# Patient Record
Sex: FEMALE | Race: WHITE | HISPANIC OR LATINO | Employment: STUDENT | URBAN - METROPOLITAN AREA
[De-identification: names, ages, dates, MRNs, and addresses within clinical notes are randomized per-mention and may not be internally consistent; named-entity substitution may affect disease eponyms.]

---
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
## 2022-04-20 ENCOUNTER — TELEPHONE (OUTPATIENT)
Dept: OBSTETRICS AND GYNECOLOGY | Facility: CLINIC | Age: 22
End: 2022-04-20
Payer: MEDICAID

## 2022-04-20 NOTE — TELEPHONE ENCOUNTER
----- Message from Colleen Barron sent at 4/20/2022 11:52 AM CDT -----  Contact: [phone]  Type:  Needs Medical Advice    Who Called: Pt  Reason for call: regarding scheduling for first Ob appt, pt would like a Female Provider  Would the patient rather a call back or a response via [facility]?  Call back  Best Call Back Number: [phone]

## 2022-05-02 ENCOUNTER — LAB VISIT (OUTPATIENT)
Dept: LAB | Facility: HOSPITAL | Age: 22
End: 2022-05-02
Attending: OBSTETRICS & GYNECOLOGY
Payer: MEDICAID

## 2022-05-02 ENCOUNTER — INITIAL PRENATAL (OUTPATIENT)
Dept: OBSTETRICS AND GYNECOLOGY | Facility: CLINIC | Age: 22
End: 2022-05-02
Payer: MEDICAID

## 2022-05-02 VITALS — DIASTOLIC BLOOD PRESSURE: 70 MMHG | BODY MASS INDEX: 34.37 KG/M2 | SYSTOLIC BLOOD PRESSURE: 110 MMHG | WEIGHT: 194 LBS

## 2022-05-02 DIAGNOSIS — O36.80X0 PREGNANCY OF UNKNOWN ANATOMIC LOCATION: ICD-10-CM

## 2022-05-02 DIAGNOSIS — Z34.01 FIRST PREGNANCY, FIRST TRIMESTER: Primary | ICD-10-CM

## 2022-05-02 DIAGNOSIS — Z34.01 FIRST PREGNANCY, FIRST TRIMESTER: ICD-10-CM

## 2022-05-02 LAB
ABO + RH BLD: NORMAL
BLD GP AB SCN CELLS X3 SERPL QL: NORMAL
ERYTHROCYTE [DISTWIDTH] IN BLOOD BY AUTOMATED COUNT: 12.8 % (ref 11.5–14.5)
HCT VFR BLD AUTO: 38.8 % (ref 37–48.5)
HGB BLD-MCNC: 13.1 G/DL (ref 12–16)
MCH RBC QN AUTO: 30.9 PG (ref 27–31)
MCHC RBC AUTO-ENTMCNC: 33.8 G/DL (ref 32–36)
MCV RBC AUTO: 92 FL (ref 82–98)
PLATELET # BLD AUTO: 330 K/UL (ref 150–450)
PMV BLD AUTO: 11.3 FL (ref 9.2–12.9)
RBC # BLD AUTO: 4.24 M/UL (ref 4–5.4)
TSH SERPL DL<=0.005 MIU/L-ACNC: 0.82 UIU/ML (ref 0.4–4)
WBC # BLD AUTO: 10.77 K/UL (ref 3.9–12.7)

## 2022-05-02 PROCEDURE — 99212 OFFICE O/P EST SF 10 MIN: CPT | Mod: PBBFAC,TH,PO | Performed by: OBSTETRICS & GYNECOLOGY

## 2022-05-02 PROCEDURE — 87088 URINE BACTERIA CULTURE: CPT | Performed by: OBSTETRICS & GYNECOLOGY

## 2022-05-02 PROCEDURE — 86762 RUBELLA ANTIBODY: CPT | Performed by: OBSTETRICS & GYNECOLOGY

## 2022-05-02 PROCEDURE — 86592 SYPHILIS TEST NON-TREP QUAL: CPT | Performed by: OBSTETRICS & GYNECOLOGY

## 2022-05-02 PROCEDURE — 87077 CULTURE AEROBIC IDENTIFY: CPT | Performed by: OBSTETRICS & GYNECOLOGY

## 2022-05-02 PROCEDURE — 86850 RBC ANTIBODY SCREEN: CPT | Performed by: OBSTETRICS & GYNECOLOGY

## 2022-05-02 PROCEDURE — 87389 HIV-1 AG W/HIV-1&-2 AB AG IA: CPT | Performed by: OBSTETRICS & GYNECOLOGY

## 2022-05-02 PROCEDURE — 87591 N.GONORRHOEAE DNA AMP PROB: CPT | Performed by: OBSTETRICS & GYNECOLOGY

## 2022-05-02 PROCEDURE — 81220 CFTR GENE COM VARIANTS: CPT | Performed by: OBSTETRICS & GYNECOLOGY

## 2022-05-02 PROCEDURE — 87340 HEPATITIS B SURFACE AG IA: CPT | Performed by: OBSTETRICS & GYNECOLOGY

## 2022-05-02 PROCEDURE — 86803 HEPATITIS C AB TEST: CPT | Performed by: OBSTETRICS & GYNECOLOGY

## 2022-05-02 PROCEDURE — 87086 URINE CULTURE/COLONY COUNT: CPT | Performed by: OBSTETRICS & GYNECOLOGY

## 2022-05-02 PROCEDURE — 99999 PR PBB SHADOW E&M-EST. PATIENT-LVL II: CPT | Mod: PBBFAC,,, | Performed by: OBSTETRICS & GYNECOLOGY

## 2022-05-02 PROCEDURE — 85027 COMPLETE CBC AUTOMATED: CPT | Performed by: OBSTETRICS & GYNECOLOGY

## 2022-05-02 PROCEDURE — 87491 CHLMYD TRACH DNA AMP PROBE: CPT | Performed by: OBSTETRICS & GYNECOLOGY

## 2022-05-02 PROCEDURE — 99204 PR OFFICE/OUTPT VISIT, NEW, LEVL IV, 45-59 MIN: ICD-10-PCS | Mod: TH,S$PBB,, | Performed by: OBSTETRICS & GYNECOLOGY

## 2022-05-02 PROCEDURE — 84443 ASSAY THYROID STIM HORMONE: CPT | Performed by: OBSTETRICS & GYNECOLOGY

## 2022-05-02 PROCEDURE — 99999 PR PBB SHADOW E&M-EST. PATIENT-LVL II: ICD-10-PCS | Mod: PBBFAC,,, | Performed by: OBSTETRICS & GYNECOLOGY

## 2022-05-02 PROCEDURE — 87186 SC STD MICRODIL/AGAR DIL: CPT | Performed by: OBSTETRICS & GYNECOLOGY

## 2022-05-02 PROCEDURE — 99204 OFFICE O/P NEW MOD 45 MIN: CPT | Mod: TH,S$PBB,, | Performed by: OBSTETRICS & GYNECOLOGY

## 2022-05-02 RX ORDER — B-COMPLEX WITH VITAMIN C
1 TABLET ORAL DAILY
Qty: 30 TABLET | Refills: 5 | Status: SHIPPED | OUTPATIENT
Start: 2022-05-02 | End: 2023-05-02

## 2022-05-02 RX ORDER — ONDANSETRON 8 MG/1
8 TABLET, ORALLY DISINTEGRATING ORAL EVERY 8 HOURS PRN
Qty: 40 TABLET | Refills: 3 | Status: SHIPPED | OUTPATIENT
Start: 2022-05-02 | End: 2022-05-12

## 2022-05-02 NOTE — PROGRESS NOTES
OBSTETRIC HISTORY:   OB History        1    Para   0    Term   0       0    AB   0    Living   0       SAB   0    IAB   0    Ectopic   0    Multiple   0    Live Births                      COMPREHENSIVE GYN HISTORY:  PAP History: Denies abnormal Paps.  Infection History: Chlamydia. Denies PID.  Benign History: Denies uterine fibroids. Denies ovarian cysts. Denies endometriosis.   Cancer History: Denies cervical cancer. Denies uterine cancer or hyperplasia. Denies ovarian cancer. Denies vulvar cancer or pre-cancer. Denies vaginal cancer or pre-cancer. Denies breast cancer. Denies colon cancer.  Sexual Activity History:   reports previously being sexually active.   Menstrual History: Regular menses.  No history of birth defects     HPI:   22 y.o.  Patient's last menstrual period was 2022.   Patient is  here for pregnancy confirmation. Some nausea..    ROS:  GENERAL: Denies weight gain or weight loss. Feeling well overall.   SKIN: Denies rash or lesions.   HEAD: Denies headache.   NODES: Denies enlarged lymph nodes.   CHEST: Denies shortness of breath.   ABDOMEN: No abdominal pain, constipation, diarrhea, vomiting or rectal bleeding.   URINARY: No frequency, dysuria, hematuria, or burning on urination.  REPRODUCTIVE: See HPI.   BREASTS: The patient denies pain, lumps, or nipple discharge.   HEMATOLOGIC: No easy bruisability.   MUSCULOSKELETAL: Denies joint pain or back pain.   NEUROLOGIC: Denies weakness.   PSYCHIATRIC: Denies depression, anxiety or mood swings.    PE:   /70   Wt 88 kg (194 lb 0.1 oz)   LMP 2022   BMI 34.37 kg/m²   APPEARANCE: Well nourished, well developed, in no acute distress.  NECK: Neck symmetric without  thyromegaly.  NODES: No inguinal, cervical lymph node enlargement.  CHEST: Lungs clear to auscultation.  HEART: Regular rate and rhythm, no murmurs, rubs or gallops.  ABDOMEN: Soft. No tenderness or masses. No hernias.  BREASTS: Symmetrical, no skin changes or  visible lesions. No palpable masses, nipple discharge or adenopathy bilaterally.  PELVIC:   VULVA: No lesions. Normal female genitalia.  URETHRAL MEATUS: Normal size and location, no lesions, no prolapse.  URETHRA: No masses, tenderness, prolapse or scarring.  VAGINA: Moist and well rugated, no discharge, no significant cystocele or rectocele.  CERVIX: No lesions and discharge.  UTERUS: Normal size, regular shape, mobile, non-tender, bladder base nontender.  ADNEXA: No masses or tenderness.    PROCEDURES:  Pap smear--NOT INDICATED  GC/CT  Urine cx  OB labs  US    Assessment/Plan:  Pregnancy  RTO 5 weeks      As of April 1, 2021, the Cures Act has been passed nationally. This new law requires that all doctors progress notes, lab results, pathology reports and radiology reports be released IMMEDIATELY to the patient in the patient portal. That means that the results are released to you at the EXACT same time they are released to me. Therefore, with all of the patients that I have I am not able to reply to each patient exactly when the results come in. So there will be a delay from when you see the results to when I see them and have time to come up with a response to send you. Also I only see these results when I am on the computer at work. So if the results come in over the weekend or after 5 pm of a work day, I will not see them until the next business day. As you can tell, this is a challenge as a physician to give every patient the quick response they hope for and deserve. So please be patient!     Thanks for understanding,

## 2022-05-03 ENCOUNTER — TELEPHONE (OUTPATIENT)
Dept: ADMINISTRATIVE | Facility: OTHER | Age: 22
End: 2022-05-03
Payer: MEDICAID

## 2022-05-03 LAB
RPR SER QL: NORMAL
RUBV IGG SER-ACNC: 9.8 IU/ML
RUBV IGG SER-IMP: ABNORMAL

## 2022-05-04 LAB
HBV SURFACE AG SERPL QL IA: NEGATIVE
HCV AB SERPL QL IA: NEGATIVE
HIV 1+2 AB+HIV1 P24 AG SERPL QL IA: NEGATIVE

## 2022-05-05 LAB
C TRACH DNA SPEC QL NAA+PROBE: NOT DETECTED
N GONORRHOEA DNA SPEC QL NAA+PROBE: NOT DETECTED

## 2022-05-06 LAB
BACTERIA UR CULT: ABNORMAL
BACTERIA UR CULT: ABNORMAL

## 2022-05-08 ENCOUNTER — TELEPHONE (OUTPATIENT)
Dept: OBSTETRICS AND GYNECOLOGY | Facility: CLINIC | Age: 22
End: 2022-05-08
Payer: MEDICAID

## 2022-05-08 RX ORDER — AMOXICILLIN AND CLAVULANATE POTASSIUM 875; 125 MG/1; MG/1
1 TABLET, FILM COATED ORAL 2 TIMES DAILY
Qty: 14 TABLET | Refills: 0 | Status: SHIPPED | OUTPATIENT
Start: 2022-05-08 | End: 2022-05-15

## 2022-05-11 ENCOUNTER — PATIENT MESSAGE (OUTPATIENT)
Dept: OBSTETRICS AND GYNECOLOGY | Facility: CLINIC | Age: 22
End: 2022-05-11
Payer: MEDICAID

## 2022-05-11 LAB
CFTR MUT ANL BLD/T: NEGATIVE
CFTR MUT ANL BLD/T: NORMAL
CFTR MUT TESTED BLD/T: NORMAL
GENETICIST REVIEW: NORMAL
REF LAB TEST METHOD: NORMAL

## 2022-05-25 ENCOUNTER — PATIENT MESSAGE (OUTPATIENT)
Dept: OBSTETRICS AND GYNECOLOGY | Facility: CLINIC | Age: 22
End: 2022-05-25
Payer: MEDICAID

## 2022-05-26 ENCOUNTER — PATIENT MESSAGE (OUTPATIENT)
Dept: ADMINISTRATIVE | Facility: OTHER | Age: 22
End: 2022-05-26
Payer: MEDICAID

## 2022-06-01 ENCOUNTER — TELEPHONE (OUTPATIENT)
Dept: OBSTETRICS AND GYNECOLOGY | Facility: CLINIC | Age: 22
End: 2022-06-01
Payer: MEDICAID

## 2022-06-01 NOTE — TELEPHONE ENCOUNTER
I called patient to reach out and schedule her next OB appointment. Patient did not answer and does not have voicemail set up.    Myesha Luque MA  06/01/2022 10:12 AM

## 2022-06-02 ENCOUNTER — PATIENT MESSAGE (OUTPATIENT)
Dept: ADMINISTRATIVE | Facility: OTHER | Age: 22
End: 2022-06-02
Payer: MEDICAID

## 2022-10-13 ENCOUNTER — PATIENT MESSAGE (OUTPATIENT)
Dept: ADMINISTRATIVE | Facility: OTHER | Age: 22
End: 2022-10-13
Payer: MEDICAID

## 2023-05-01 ENCOUNTER — HOSPITAL ENCOUNTER (EMERGENCY)
Facility: HOSPITAL | Age: 23
Discharge: HOME | End: 2023-05-01
Attending: EMERGENCY MEDICINE
Payer: MEDICAID

## 2023-05-01 VITALS
TEMPERATURE: 98 F | RESPIRATION RATE: 16 BRPM | HEART RATE: 99 BPM | HEIGHT: 67 IN | BODY MASS INDEX: 34.53 KG/M2 | SYSTOLIC BLOOD PRESSURE: 93 MMHG | OXYGEN SATURATION: 96 % | DIASTOLIC BLOOD PRESSURE: 51 MMHG | WEIGHT: 220 LBS

## 2023-05-01 DIAGNOSIS — F10.929 ALCOHOLIC INTOXICATION WITH COMPLICATION: Primary | ICD-10-CM

## 2023-05-01 PROCEDURE — 99283 EMERGENCY DEPT VISIT LOW MDM: CPT

## 2023-05-01 NOTE — ED PROVIDER NOTES
Encounter Date: 5/1/2023       History     Chief Complaint   Patient presents with    Drug / Alcohol Assessment     Pt arrived via ems, pt chief complaint is Alcohol assessment. Pt is very intoxicated and emotional during triage. Pt called 911 due to intoxication.       23-year-old female otherwise healthy presenting for alcohol intoxication.  Patient reports she drank a for loco tonight.  Reports she became intoxicated, scared, emotional.  Reports feeling better after coming to the emergency department.  Reports she does not normally drink alcohol.  Has no further medical complaints.    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 [facility] and [facility] of psychiatric care     Panic disorder     Psychiatric exam requested by authority     Psychiatric problem     PTSD (post-traumatic stress disorder)     Substance abuse     Suicide attempt     Therapy     Withdrawal symptoms, drug or narcotic      History reviewed. No pertinent surgical history.  History reviewed. No pertinent family history.  Social History     Tobacco Use    Smoking status: Never    Smokeless tobacco: Never   Substance Use Topics    Alcohol use: Yes    Drug use: Not Currently     Types: Oxycodone     Comment: quit 4 wks ago     Review of Systems   Constitutional:  Negative for chills and fever.   HENT:  Negative for sore throat.    Respiratory:  Negative for shortness of breath.    Cardiovascular:  Negative for chest pain.   Gastrointestinal:  Negative for nausea.   Genitourinary:  Negative for dysuria.   Musculoskeletal:  Negative for back pain.   Skin:  Negative for rash.   Neurological:  Negative for weakness.   Hematological:  Does not bruise/bleed easily.     Physical Exam     Initial Vitals  [05/01/23 0037]   BP Pulse Resp Temp SpO2   130/76 (!) 119 18 98.2 °F (36.8 °C) 99 %      MAP       --         Physical Exam    Nursing note and vitals reviewed.  Constitutional: She appears well-developed and well-nourished. She is not diaphoretic. No distress.   HENT:   Head: Normocephalic and atraumatic.   Right Ear: External ear normal.   Left Ear: External ear normal.   Mouth/Throat: No oropharyngeal exudate.   Eyes: EOM are normal. Pupils are equal, round, and reactive to light. Right eye exhibits no discharge. Left eye exhibits no discharge.   Neck: No JVD present.   Cardiovascular:  Normal rate and intact distal pulses.           Pulmonary/Chest: No respiratory distress.   Abdominal: She exhibits no distension. There is no guarding.   Musculoskeletal:         General: No edema.     Neurological: She is alert and oriented to person, place, and time. GCS score is 15. GCS eye subscore is 4. GCS verbal subscore is 5. GCS motor subscore is 6.   Skin: Skin is warm and dry.   Psychiatric: She has a normal mood and affect. Her behavior is normal.       ED Course   Procedures  Labs Reviewed - No data to display       Imaging Results    None          Medications - No data to display  Medical Decision Making:   Initial Assessment:   Patient metabolized appropriately.  Okay for discharge home.  Suspect acute alcohol intoxication.  No evidence of trauma, infection, or other injury.                        Clinical Impression:   Final diagnoses:  [F10.929] Alcoholic intoxication with complication (Primary)        ED Disposition Condition    Discharge Stable          ED Prescriptions    None       Follow-up Information       Follow up With Specialties Details Why Contact [facility] - Emergency Dept Emergency Medicine  As needed, If symptoms worsen [address]  [facility] [id number] [phone]             Maksim Lucas MD  05/01/23 5643

## 2023-10-07 ENCOUNTER — HOSPITAL ENCOUNTER (EMERGENCY)
Facility: HOSPITAL | Age: 23
Discharge: HOME | End: 2023-10-07
Attending: EMERGENCY MEDICINE
Payer: MEDICAID

## 2023-10-07 VITALS
SYSTOLIC BLOOD PRESSURE: 122 MMHG | HEIGHT: 65 IN | HEART RATE: 75 BPM | TEMPERATURE: 99 F | WEIGHT: 204 LBS | DIASTOLIC BLOOD PRESSURE: 68 MMHG | BODY MASS INDEX: 33.99 KG/M2 | OXYGEN SATURATION: 99 % | RESPIRATION RATE: 16 BRPM

## 2023-10-07 DIAGNOSIS — J02.9 SORE THROAT: ICD-10-CM

## 2023-10-07 DIAGNOSIS — J02.9 VIRAL PHARYNGITIS: Primary | ICD-10-CM

## 2023-10-07 LAB
CTP QC/QA: YES
MOLECULAR STREP A: NEGATIVE
POC MOLECULAR INFLUENZA A AGN: NEGATIVE
POC MOLECULAR INFLUENZA B AGN: NEGATIVE
SARS-COV-2 RDRP RESP QL NAA+PROBE: NEGATIVE

## 2023-10-07 PROCEDURE — 87635 SARS-COV-2 COVID-19 AMP PRB: CPT | Performed by: NURSE PRACTITIONER

## 2023-10-07 PROCEDURE — 87502 INFLUENZA DNA AMP PROBE: CPT

## 2023-10-07 PROCEDURE — 99284 EMERGENCY DEPT VISIT MOD MDM: CPT

## 2023-10-07 RX ORDER — BENZOCAINE AND MENTHOL 15; 3.6 MG/1; MG/1
1 LOZENGE ORAL
Qty: 16 LOZENGE | Refills: 0 | Status: SHIPPED | OUTPATIENT
Start: 2023-10-07

## 2023-10-07 RX ORDER — ACETAMINOPHEN 500 MG
500 TABLET ORAL EVERY 4 HOURS PRN
Qty: 30 TABLET | Refills: 0 | OUTPATIENT
Start: 2023-10-07 | End: 2023-12-26

## 2023-10-07 NOTE — ED PROVIDER NOTES
Encounter Date: 10/7/2023    SCRIBE #1 NOTE: I, Megan Estrada, am scribing for, and in the presence of,  Hussein Lopez NP. I have scribed the following portions of the note - Other sections scribed: HPI, ROS.       History     Chief Complaint   Patient presents with    Sore Throat     Patient reports sore throat has H/O tonsillitis and needs hem put, states also 9 weeks pregnant and having cramping no bleeding     24 y/o gravid female with PMHx of tonsillitis, who presents to the ED complaining of sore throat and body aches for 4 days. Pt notes attempted treatment with Ibuprofen, but states she usually experiences abdominal pain after. Pt notes her symptoms feel similar to Hx of tonsillitis, and states she usually has to have the area drained and treated with antibiotics. Pt states she never finishes full course of antibiotics and was last on antibiotics around a year ago. Pt notes her daughter and 3 others she lives with are positive for COVID-19. Pt denies fever, cough, and any other associated symptoms.     The history is provided by the patient. No  was used.     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 [facility] and [facility] of psychiatric care     Panic disorder     Psychiatric exam requested by authority     Psychiatric problem     PTSD (post-traumatic stress disorder)     Substance abuse     Suicide attempt     Therapy     Withdrawal symptoms, drug or narcotic      History reviewed. No pertinent surgical history.  History reviewed. No pertinent family history.  Social History     Tobacco Use    Smoking status: Never    Smokeless tobacco: Never   Substance Use Topics    Alcohol use: Yes    Drug use: Not Currently     Types: Oxycodone      Comment: quit 4 wks ago     Review of Systems   Constitutional:  Negative for chills, fatigue and fever.   HENT:  Positive for sore throat. Negative for congestion, ear pain, nosebleeds, postnasal drip, rhinorrhea and sinus pressure.    Eyes:  Negative for photophobia and pain.   Respiratory:  Negative for apnea, cough, choking, chest tightness, shortness of breath, wheezing and stridor.    Cardiovascular:  Negative for chest pain, palpitations and leg swelling.   Gastrointestinal:  Negative for abdominal pain, constipation, diarrhea, nausea and vomiting.   Genitourinary:  Negative for dysuria, flank pain, hematuria, pelvic pain and vaginal discharge.   Musculoskeletal:  Positive for myalgias (body aches). Negative for arthralgias, back pain, gait problem and neck pain.   Skin:  Negative for color change, pallor, rash and wound.   Neurological:  Negative for dizziness, seizures, syncope, facial asymmetry, light-headedness and headaches.   Hematological:  Negative for adenopathy.   Psychiatric/Behavioral:  Negative for confusion. The patient is not nervous/anxious.        Physical Exam     Initial Vitals [10/07/23 1628]   BP Pulse Resp Temp SpO2   126/71 83 18 98.4 °F (36.9 °C) 100 %      MAP       --         Physical Exam    Nursing note and vitals reviewed.  Constitutional: She appears well-developed and well-nourished.   HENT:   Head: Normocephalic and atraumatic.   Right Ear: External ear normal.   Left Ear: External ear normal.   Nose: Nose normal.   Mouth/Throat: Uvula is midline and mucous membranes are normal. Posterior oropharyngeal erythema present. No posterior oropharyngeal edema or tonsillar abscesses.   Eyes: Conjunctivae and EOM are normal. Right eye exhibits no discharge. Left eye exhibits no discharge.   Neck: Neck supple. No tracheal deviation present.   Normal range of motion.  Cardiovascular:  Normal rate.           Pulmonary/Chest: No stridor. No respiratory distress.   Abdominal: Abdomen is  soft. She exhibits no distension. There is no abdominal tenderness.   Musculoskeletal:         General: No tenderness. Normal range of motion.      Cervical back: Normal range of motion and neck supple.     Neurological: She is alert and oriented to person, place, and time. She has normal strength. No cranial nerve deficit or sensory deficit.   Skin: Skin is warm and dry.   Psychiatric: She has a normal mood and affect. Her behavior is normal. Judgment and thought content normal.         ED Course   Procedures  Labs Reviewed   POCT STREP A MOLECULAR   SARS-COV-2 RDRP GENE   POCT INFLUENZA A/B MOLECULAR          Imaging Results    None          Medications - No data to display  Medical Decision Making  DDx:  Influenza, viral syndrome, COVID, strep pharyngitis, viral pharyngitis, otitis media, sinusitis, pneumonia, bronchitis, meningitis, sepsis, others    HPI and physical exam as above.      The patient appears to have a viral pharyngitis.  Based upon the history and physical exam the patient does not appear to have a serious bacterial infection such as pneumonia, sepsis, otitis media, bacterial sinusitis, strep pharyngitis, parapharyngeal or peritonsillar abscess, meningitis. Strep, COVID, flu negative. Respiratory effort is normal with no signs of increased work of breathing or respiratory distress. Lungs are clear to auscultation bilaterally in all fields. Mucous membranes are moist and the patient is tolerating P.O. without difficulty.  Patient is afebrile.  Patient is nontoxic, alert, active, and appears very well at this time just prior to discharge. I have given specific return precautions to the patient.  I will prescribe medications to treat her symptoms.     The results and physical exam findings were reviewed with the patient. Advised them to follow up with her PCP for re-evaluation and further management.  ED return precautions given. All questions regarding diagnosis and plan were answered to the  patient's fullest possible satisfaction. Patient expressed understanding of diagnosis, discharge instructions, and return precautions.        Amount and/or Complexity of Data Reviewed  Labs: ordered. Decision-making details documented in ED Course.    Risk  OTC drugs.            Scribe Attestation:   Scribe #1: I performed the above scribed service and the documentation accurately describes the services I performed. I attest to the accuracy of the note.            Scribe attestation: I, Hussein Lopez NP, personally performed the services described in this documentation. All medical record entries made by the scribe were at my direction and in my presence.  I have reviewed the chart and agree that the record reflects my personal performance and is accurate and complete.             Clinical Impression:   Final diagnoses:  [J02.9] Viral pharyngitis (Primary)  [J02.9] Sore throat        ED Disposition Condition    Discharge Stable          ED Prescriptions       Medication Sig Dispense Start Date End Date Auth. Provider    acetaminophen (TYLENOL) 500 MG tablet Take 1 tablet (500 mg total) by mouth every 4 (four) hours as needed (Fever). 30 tablet 10/7/2023 -- Hussein Lopez, NP    benzocaine-menthoL (CEPACOL SORE THROAT, PAXTON-MEN,) 15-3.6 mg Lozg 1 lozenge by Mucous Membrane route every 3 (three) hours as needed (Sore Throat). 16 lozenge 10/7/2023 -- Hussein Lopez NP          Follow-up Information       Follow up With Specialties Details Why Contact [facility] - Emergency Dept Emergency Medicine Go to  If symptoms worsen, As needed [address]  [facility] [id number] [phone]             Hussein Lopez NP  10/13/23 1076

## 2023-10-07 NOTE — ED TRIAGE NOTES
Pt reports sore throat symptoms, hx of tonsillitis, so reports she has a hx of taking abx for thonsillitis/utis/infections but stops when taking when she starts to feel better.  She reports she is 9 weeks pregnant with cramping, no bleeding, but with white discharge foul odor.

## 2023-10-07 NOTE — DISCHARGE INSTRUCTIONS

## 2023-12-26 ENCOUNTER — HOSPITAL ENCOUNTER (EMERGENCY)
Facility: HOSPITAL | Age: 23
Discharge: HOME | End: 2023-12-26
Attending: EMERGENCY MEDICINE
Payer: MEDICAID

## 2023-12-26 VITALS
BODY MASS INDEX: 32.49 KG/M2 | WEIGHT: 195 LBS | DIASTOLIC BLOOD PRESSURE: 61 MMHG | HEIGHT: 65 IN | HEART RATE: 90 BPM | SYSTOLIC BLOOD PRESSURE: 102 MMHG | OXYGEN SATURATION: 100 % | TEMPERATURE: 99 F | RESPIRATION RATE: 20 BRPM

## 2023-12-26 DIAGNOSIS — L03.317 CELLULITIS OF BUTTOCK: ICD-10-CM

## 2023-12-26 DIAGNOSIS — Z34.91 FIRST TRIMESTER PREGNANCY: ICD-10-CM

## 2023-12-26 DIAGNOSIS — L05.01 PILONIDAL ABSCESS: Primary | ICD-10-CM

## 2023-12-26 DIAGNOSIS — N30.01 ACUTE CYSTITIS WITH HEMATURIA: ICD-10-CM

## 2023-12-26 LAB
ALBUMIN SERPL BCP-MCNC: 3.5 G/DL (ref 3.5–5.2)
ALLENS TEST: NORMAL
ALP SERPL-CCNC: 79 U/L (ref 55–135)
ALT SERPL W/O P-5'-P-CCNC: 13 U/L (ref 10–44)
AMPHET+METHAMPHET UR QL: NEGATIVE
ANION GAP SERPL CALC-SCNC: 11 MMOL/L (ref 8–16)
AST SERPL-CCNC: 13 U/L (ref 10–40)
B-HCG UR QL: POSITIVE
BACTERIA #/AREA URNS HPF: ABNORMAL /HPF
BARBITURATES UR QL SCN>200 NG/ML: NEGATIVE
BASOPHILS # BLD AUTO: 0.03 K/UL (ref 0–0.2)
BASOPHILS NFR BLD: 0.2 % (ref 0–1.9)
BENZODIAZ UR QL SCN>200 NG/ML: NEGATIVE
BILIRUB SERPL-MCNC: 0.6 MG/DL (ref 0.1–1)
BILIRUB UR QL STRIP: NEGATIVE
BUN SERPL-MCNC: 8 MG/DL (ref 6–20)
BZE UR QL SCN: NEGATIVE
CALCIUM SERPL-MCNC: 9.2 MG/DL (ref 8.7–10.5)
CANNABINOIDS UR QL SCN: ABNORMAL
CHLORIDE SERPL-SCNC: 106 MMOL/L (ref 95–110)
CLARITY UR: ABNORMAL
CO2 SERPL-SCNC: 19 MMOL/L (ref 23–29)
COLOR UR: ABNORMAL
CREAT SERPL-MCNC: 0.7 MG/DL (ref 0.5–1.4)
CREAT UR-MCNC: 93.7 MG/DL (ref 15–325)
CRP SERPL-MCNC: 210.7 MG/L (ref 0–8.2)
CTP QC/QA: YES
DIFFERENTIAL METHOD BLD: ABNORMAL
EOSINOPHIL # BLD AUTO: 0.1 K/UL (ref 0–0.5)
EOSINOPHIL NFR BLD: 0.4 % (ref 0–8)
ERYTHROCYTE [DISTWIDTH] IN BLOOD BY AUTOMATED COUNT: 15.8 % (ref 11.5–14.5)
ERYTHROCYTE [SEDIMENTATION RATE] IN BLOOD BY WESTERGREN METHOD: 68 MM/HR (ref 0–20)
EST. GFR  (NO RACE VARIABLE): >60 ML/MIN/1.73 M^2
ETHANOL SERPL-MCNC: <10 MG/DL
GLUCOSE SERPL-MCNC: 105 MG/DL (ref 70–110)
GLUCOSE UR QL STRIP: NEGATIVE
HCG INTACT+B SERPL-ACNC: 302 MIU/ML
HCT VFR BLD AUTO: 32 % (ref 37–48.5)
HGB BLD-MCNC: 10.5 G/DL (ref 12–16)
HGB UR QL STRIP: ABNORMAL
HYALINE CASTS #/AREA URNS LPF: 0 /LPF
IMM GRANULOCYTES # BLD AUTO: 0.07 K/UL (ref 0–0.04)
IMM GRANULOCYTES NFR BLD AUTO: 0.4 % (ref 0–0.5)
KETONES UR QL STRIP: ABNORMAL
LDH SERPL L TO P-CCNC: 1.01 MMOL/L (ref 0.5–2.2)
LEUKOCYTE ESTERASE UR QL STRIP: ABNORMAL
LYMPHOCYTES # BLD AUTO: 1.7 K/UL (ref 1–4.8)
LYMPHOCYTES NFR BLD: 11 % (ref 18–48)
MCH RBC QN AUTO: 25.8 PG (ref 27–31)
MCHC RBC AUTO-ENTMCNC: 32.8 G/DL (ref 32–36)
MCV RBC AUTO: 79 FL (ref 82–98)
METHADONE UR QL SCN>300 NG/ML: NEGATIVE
MICROSCOPIC COMMENT: ABNORMAL
MONOCYTES # BLD AUTO: 0.9 K/UL (ref 0.3–1)
MONOCYTES NFR BLD: 5.7 % (ref 4–15)
NEUTROPHILS # BLD AUTO: 13 K/UL (ref 1.8–7.7)
NEUTROPHILS NFR BLD: 82.3 % (ref 38–73)
NITRITE UR QL STRIP: POSITIVE
NRBC BLD-RTO: 0 /100 WBC
OPIATES UR QL SCN: NEGATIVE
PCP UR QL SCN>25 NG/ML: NEGATIVE
PH UR STRIP: 6 [PH] (ref 5–8)
PLATELET # BLD AUTO: 302 K/UL (ref 150–450)
PMV BLD AUTO: 11.6 FL (ref 9.2–12.9)
POC MOLECULAR INFLUENZA A AGN: NEGATIVE
POC MOLECULAR INFLUENZA B AGN: NEGATIVE
POCT GLUCOSE: 103 MG/DL (ref 70–110)
POTASSIUM SERPL-SCNC: 3.1 MMOL/L (ref 3.5–5.1)
PROT SERPL-MCNC: 7.6 G/DL (ref 6–8.4)
PROT UR QL STRIP: ABNORMAL
RBC # BLD AUTO: 4.07 M/UL (ref 4–5.4)
RBC #/AREA URNS HPF: 37 /HPF (ref 0–4)
SAMPLE: NORMAL
SARS-COV-2 RDRP RESP QL NAA+PROBE: NEGATIVE
SITE: NORMAL
SODIUM SERPL-SCNC: 136 MMOL/L (ref 136–145)
SP GR UR STRIP: 1.01 (ref 1–1.03)
SQUAMOUS #/AREA URNS HPF: 8 /HPF
TOXICOLOGY INFORMATION: ABNORMAL
URN SPEC COLLECT METH UR: ABNORMAL
UROBILINOGEN UR STRIP-ACNC: NEGATIVE EU/DL
WBC # BLD AUTO: 15.73 K/UL (ref 3.9–12.7)
WBC #/AREA URNS HPF: >100 /HPF (ref 0–5)
WBC CLUMPS URNS QL MICRO: ABNORMAL

## 2023-12-26 PROCEDURE — 85652 RBC SED RATE AUTOMATED: CPT | Performed by: EMERGENCY MEDICINE

## 2023-12-26 PROCEDURE — 83605 ASSAY OF LACTIC ACID: CPT

## 2023-12-26 PROCEDURE — 84702 CHORIONIC GONADOTROPIN TEST: CPT | Performed by: EMERGENCY MEDICINE

## 2023-12-26 PROCEDURE — 96361 HYDRATE IV INFUSION ADD-ON: CPT | Mod: 59

## 2023-12-26 PROCEDURE — 82962 GLUCOSE BLOOD TEST: CPT

## 2023-12-26 PROCEDURE — 63600175 PHARM REV CODE 636 W HCPCS: Performed by: STUDENT IN AN ORGANIZED HEALTH CARE EDUCATION/TRAINING PROGRAM

## 2023-12-26 PROCEDURE — 87086 URINE CULTURE/COLONY COUNT: CPT | Performed by: STUDENT IN AN ORGANIZED HEALTH CARE EDUCATION/TRAINING PROGRAM

## 2023-12-26 PROCEDURE — 99285 EMERGENCY DEPT VISIT HI MDM: CPT | Mod: 25

## 2023-12-26 PROCEDURE — 25000003 PHARM REV CODE 250: Performed by: STUDENT IN AN ORGANIZED HEALTH CARE EDUCATION/TRAINING PROGRAM

## 2023-12-26 PROCEDURE — 93005 ELECTROCARDIOGRAM TRACING: CPT

## 2023-12-26 PROCEDURE — 87635 SARS-COV-2 COVID-19 AMP PRB: CPT | Performed by: STUDENT IN AN ORGANIZED HEALTH CARE EDUCATION/TRAINING PROGRAM

## 2023-12-26 PROCEDURE — 85025 COMPLETE CBC W/AUTO DIFF WBC: CPT | Performed by: STUDENT IN AN ORGANIZED HEALTH CARE EDUCATION/TRAINING PROGRAM

## 2023-12-26 PROCEDURE — 82077 ASSAY SPEC XCP UR&BREATH IA: CPT | Performed by: EMERGENCY MEDICINE

## 2023-12-26 PROCEDURE — 99900035 HC TECH TIME PER 15 MIN (STAT)

## 2023-12-26 PROCEDURE — 81000 URINALYSIS NONAUTO W/SCOPE: CPT | Performed by: STUDENT IN AN ORGANIZED HEALTH CARE EDUCATION/TRAINING PROGRAM

## 2023-12-26 PROCEDURE — 87502 INFLUENZA DNA AMP PROBE: CPT

## 2023-12-26 PROCEDURE — 81025 URINE PREGNANCY TEST: CPT | Performed by: EMERGENCY MEDICINE

## 2023-12-26 PROCEDURE — 93010 ELECTROCARDIOGRAM REPORT: CPT | Mod: ,,, | Performed by: INTERNAL MEDICINE

## 2023-12-26 PROCEDURE — 87040 BLOOD CULTURE FOR BACTERIA: CPT | Mod: 59 | Performed by: STUDENT IN AN ORGANIZED HEALTH CARE EDUCATION/TRAINING PROGRAM

## 2023-12-26 PROCEDURE — 86140 C-REACTIVE PROTEIN: CPT | Performed by: EMERGENCY MEDICINE

## 2023-12-26 PROCEDURE — 96375 TX/PRO/DX INJ NEW DRUG ADDON: CPT | Mod: 59

## 2023-12-26 PROCEDURE — 87088 URINE BACTERIA CULTURE: CPT | Performed by: STUDENT IN AN ORGANIZED HEALTH CARE EDUCATION/TRAINING PROGRAM

## 2023-12-26 PROCEDURE — 10080 I&D PILONIDAL CYST SIMPLE: CPT

## 2023-12-26 PROCEDURE — 87186 SC STD MICRODIL/AGAR DIL: CPT | Performed by: STUDENT IN AN ORGANIZED HEALTH CARE EDUCATION/TRAINING PROGRAM

## 2023-12-26 PROCEDURE — 80307 DRUG TEST PRSMV CHEM ANLYZR: CPT | Performed by: EMERGENCY MEDICINE

## 2023-12-26 PROCEDURE — 87077 CULTURE AEROBIC IDENTIFY: CPT | Performed by: STUDENT IN AN ORGANIZED HEALTH CARE EDUCATION/TRAINING PROGRAM

## 2023-12-26 PROCEDURE — 96374 THER/PROPH/DIAG INJ IV PUSH: CPT

## 2023-12-26 PROCEDURE — 80053 COMPREHEN METABOLIC PANEL: CPT | Performed by: STUDENT IN AN ORGANIZED HEALTH CARE EDUCATION/TRAINING PROGRAM

## 2023-12-26 RX ORDER — CLINDAMYCIN HYDROCHLORIDE 300 MG/1
300 CAPSULE ORAL EVERY 8 HOURS
Qty: 30 CAPSULE | Refills: 0 | Status: SHIPPED | OUTPATIENT
Start: 2023-12-26 | End: 2024-01-05

## 2023-12-26 RX ORDER — HYDROMORPHONE HYDROCHLORIDE 1 MG/ML
1 INJECTION, SOLUTION INTRAMUSCULAR; INTRAVENOUS; SUBCUTANEOUS
Status: DISCONTINUED | OUTPATIENT
Start: 2023-12-26 | End: 2023-12-26

## 2023-12-26 RX ORDER — LIDOCAINE HYDROCHLORIDE AND EPINEPHRINE 20; 10 MG/ML; UG/ML
10 INJECTION, SOLUTION INFILTRATION; PERINEURAL ONCE
Status: COMPLETED | OUTPATIENT
Start: 2023-12-26 | End: 2023-12-26

## 2023-12-26 RX ORDER — CLINDAMYCIN PHOSPHATE 900 MG/50ML
900 INJECTION, SOLUTION INTRAVENOUS
Status: DISCONTINUED | OUTPATIENT
Start: 2023-12-26 | End: 2023-12-26

## 2023-12-26 RX ORDER — CEPHALEXIN 500 MG/1
500 CAPSULE ORAL 4 TIMES DAILY
Qty: 20 CAPSULE | Refills: 0 | Status: SHIPPED | OUTPATIENT
Start: 2023-12-26 | End: 2023-12-29

## 2023-12-26 RX ORDER — MORPHINE SULFATE 4 MG/ML
4 INJECTION, SOLUTION INTRAMUSCULAR; INTRAVENOUS
Status: COMPLETED | OUTPATIENT
Start: 2023-12-26 | End: 2023-12-26

## 2023-12-26 RX ORDER — ACETAMINOPHEN 500 MG
500 TABLET ORAL EVERY 6 HOURS PRN
Qty: 20 TABLET | Refills: 0 | Status: SHIPPED | OUTPATIENT
Start: 2023-12-26 | End: 2023-12-31

## 2023-12-26 RX ORDER — FAMOTIDINE 20 MG
1 TABLET ORAL DAILY
Qty: 30 TABLET | Refills: 0 | Status: SHIPPED | OUTPATIENT
Start: 2023-12-26 | End: 2024-12-25

## 2023-12-26 RX ORDER — ONDANSETRON HYDROCHLORIDE 2 MG/ML
4 INJECTION, SOLUTION INTRAVENOUS
Status: COMPLETED | OUTPATIENT
Start: 2023-12-26 | End: 2023-12-26

## 2023-12-26 RX ORDER — ACETAMINOPHEN 500 MG
1000 TABLET ORAL
Status: COMPLETED | OUTPATIENT
Start: 2023-12-26 | End: 2023-12-26

## 2023-12-26 RX ORDER — POTASSIUM CHLORIDE 20 MEQ/1
40 TABLET, EXTENDED RELEASE ORAL ONCE
Status: COMPLETED | OUTPATIENT
Start: 2023-12-26 | End: 2023-12-26

## 2023-12-26 RX ADMIN — MORPHINE SULFATE 4 MG: 4 INJECTION, SOLUTION INTRAMUSCULAR; INTRAVENOUS at 04:12

## 2023-12-26 RX ADMIN — LIDOCAINE HYDROCHLORIDE,EPINEPHRINE BITARTRATE 10 ML: 20; .01 INJECTION, SOLUTION INFILTRATION; PERINEURAL at 05:12

## 2023-12-26 RX ADMIN — ACETAMINOPHEN 1000 MG: 500 TABLET, FILM COATED ORAL at 04:12

## 2023-12-26 RX ADMIN — ONDANSETRON 4 MG: 2 INJECTION INTRAMUSCULAR; INTRAVENOUS at 04:12

## 2023-12-26 RX ADMIN — POTASSIUM CHLORIDE 40 MEQ: 1500 TABLET, EXTENDED RELEASE ORAL at 04:12

## 2023-12-26 RX ADMIN — SODIUM CHLORIDE, POTASSIUM CHLORIDE, SODIUM LACTATE AND CALCIUM CHLORIDE 1710 ML: 600; 310; 30; 20 INJECTION, SOLUTION INTRAVENOUS at 03:12

## 2023-12-26 NOTE — DISCHARGE INSTRUCTIONS
You were evaluated and treated in the emergency department today. You were found to have a diagnoses that can be managed well at home, however that requires your commitment to getting better.   Problem-Specific Instructions:  Take antibiotics as prescribed.  Follow-up with primary care provider.  Return to the emergency room in 2 days for wound check and packing removal.  Return for fever not improving with Tylenol.  Establish with OBGYN.  Take prenatal vitamins.     Ensure you follow up with your Primary Care Provider or any additional providers listed on this discharge sheet. While you may be healthy enough to go home today, I cannot predict the exact course of your diagnoses. As such, it is your responsibility to monitor symptoms, follow-up with another healthcare provider, or return to the emergency room for new or worsening concerns. Unless otherwise instructed, continue all home medications and any new medications prescribed to you in the Emergency Department.   General Maintenance: Ensure adequate hydration to prevent prolonged illness and recovery. Monitor your caloric intake with a goal of obtaining and maintaining a healthy weight to help prevent the development of chronic and life-threatening medical conditions. Start healthy fitness habits and aim for a goal of 30 minutes to an hour of exercise 3-5 times a week. Avoid the use of tobacco, alcohol, and illicit drugs as these may be detrimental to your health goals.

## 2023-12-26 NOTE — ED PROVIDER NOTES
Encounter Date: 12/26/2023       History     Chief Complaint   Patient presents with    Abscess     Pt presents to ED c/o abscess above the buttock x 4 days, hard and tender to touch.  Denies drainage, fever, chills, n/v or any other symptoms.    Pt also reports body aches.  Pt reports taking IBU x 2 hr pta with some relief.  Pain 7/10.      Old female PMH significant for polysubstance abuse, PTSD, BPD and depression presents emergency room today for evaluation of buttock abscess.  Abscess has been present for the last 4 days, acutely worsening over the last 2 days.  Not associated with fevers.  Is associated with chills and myalgias.  No cough, congestion.  No abdominal pain.  No rectal pain, pain with defecation, rectal bleeding.  No abnormal vaginal discharge, vaginal bleeding.  No urinary symptoms.    Of note patient reports decreased food intake and increased caffeine (7 monster energy drinks) due to working a lot.    The history is provided by the patient. No  was used.     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 [facility] and [facility] of [facility] care     Panic disorder     Psychiatric exam requested by authority     Psychiatric problem     PTSD (post-traumatic stress disorder)     Substance abuse     Suicide attempt     Therapy     Withdrawal symptoms, drug or narcotic      No past surgical history on file.  No family history on file.  Social History     Tobacco Use    Smoking status: Never    Smokeless tobacco: Never   Substance Use Topics    Alcohol use: Yes    Drug use: Not Currently     Types: Oxycodone     Comment: quit 4 wks ago     Review of Systems   Constitutional:  Negative for chills, fatigue and fever.   HENT:  Negative  for congestion, hearing loss, sore throat and trouble swallowing.    Eyes:  Negative for visual disturbance.   Respiratory:  Negative for cough, chest tightness and shortness of breath.    Cardiovascular:  Negative for chest pain.   Gastrointestinal:  Negative for abdominal pain and nausea.   Endocrine: Negative for polyuria.   Genitourinary:  Negative for difficulty urinating.   Musculoskeletal:  Negative for arthralgias and myalgias.   Skin:  Positive for wound. Negative for rash.   Neurological:  Negative for dizziness and headaches.   Psychiatric/Behavioral:  The patient is not nervous/anxious.        Physical Exam     Initial Vitals [12/26/23 0316]   BP Pulse Resp Temp SpO2   130/74 (!) 124 20 98.8 °F (37.1 °C) 99 %      MAP       --         Physical Exam    Nursing note and vitals reviewed.  Constitutional: She appears well-developed and well-nourished. She is diaphoretic.   HENT:   Head: Normocephalic and atraumatic.   Nose: Nose normal.   Slightly dry mucous membranes   Eyes: Conjunctivae are normal. Pupils are equal, round, and reactive to light.   Neck: Neck supple.   Normal range of motion.  Cardiovascular:  Regular rhythm, normal heart sounds and intact distal pulses.           Tachycardic   Pulmonary/Chest: Breath sounds normal. No respiratory distress.   Abdominal: Abdomen is soft. She exhibits no distension. There is no abdominal tenderness.   Musculoskeletal:         General: Normal range of motion.      Cervical back: Normal range of motion and neck supple.     Neurological: She is alert and oriented to person, place, and time. GCS score is 15. GCS eye subscore is 4. GCS verbal subscore is 5. GCS motor subscore is 6.   Skin: Skin is warm. Capillary refill takes less than 2 seconds.   5x2cm area of erythema, induration and fluctuance noted to the right superior buttock consistent with pilonidal abscess.  Active punctate drainage of purulent material.   Psychiatric: She has a normal mood and affect.  "Her behavior is normal. Judgment and thought content normal.         ED Course   I & D - Incision and Drainage    Date/Time: 2023 3:44 AM  Location procedure was performed: [facility] EMERGENCY DEPARTMENT    Performed by: Jose Hannon PA-C  Authorized by: Jose Hannon PA-C  Pre-operative diagnosis: Abscess  Post-operative diagnosis: Abscess  Consent Done: Yes  Consent: Verbal consent obtained.  Risks and benefits: risks, benefits and alternatives were discussed  Consent given by: patient  Patient identity confirmed: , name and verbally with patient  Time out: Immediately prior to procedure a "time out" was called to verify the correct patient, procedure, equipment, support staff and site/side marked as required.  Type: abscess  Body area: anogenital  Location details: pilonidal  Anesthesia: local infiltration    Anesthesia:  Local Anesthetic: lidocaine 2% with epinephrine  Anesthetic total: 6 mL  Scalpel size: 11  Incision type: single straight  Incision depth: dermal  Complexity: complex  Drainage: purulent  Drainage amount: copious  Wound treatment: incision, drainage, deloculation and wound packed  Packing material: 1/4 in gauze  Patient tolerance: Patient tolerated the procedure well with no immediate complications    Incision depth: dermal      Critical Care    Date/Time: 2023 4:00 AM    Performed by: Jose Hannon PA-C  Authorized by: Amberly Myles MD  Direct patient critical care time: 15 minutes  Additional history critical care time: 5 minutes  Ordering / reviewing critical care time: 5 minutes  Documentation critical care time: 5 minutes  Consulting other physicians critical care time: 5 minutes  Total critical care time (exclusive of procedural time) : 35 minutes  Critical care time was exclusive of separately billable procedures and treating other patients and teaching time.  Critical care was necessary to treat or prevent imminent or life-threatening deterioration of the " following conditions: dehydration, sepsis, circulatory failure and shock.  Critical care was time spent personally by me on the following activities: examination of patient, obtaining history from patient or surrogate, ordering and performing treatments and interventions, ordering and review of laboratory studies, ordering and review of radiographic studies, pulse oximetry, review of old charts and re-evaluation of patient's condition.        Labs Reviewed   CBC W/ AUTO DIFFERENTIAL - Abnormal; Notable for the following components:       Result Value    WBC 15.73 (*)     Hemoglobin 10.5 (*)     Hematocrit 32.0 (*)     MCV 79 (*)     MCH 25.8 (*)     RDW 15.8 (*)     Gran # (ANC) 13.0 (*)     Immature Grans (Abs) 0.07 (*)     Gran % 82.3 (*)     Lymph % 11.0 (*)     All other components within normal limits   COMPREHENSIVE METABOLIC PANEL - Abnormal; Notable for the following components:    Potassium 3.1 (*)     CO2 19 (*)     All other components within normal limits   URINALYSIS, REFLEX TO URINE CULTURE - Abnormal; Notable for the following components:    Color, UA Orange (*)     Appearance, UA Cloudy (*)     Protein, UA 1+ (*)     Ketones, UA 2+ (*)     Occult Blood UA 2+ (*)     Nitrite, UA Positive (*)     Leukocytes, UA 3+ (*)     All other components within normal limits    Narrative:     Specimen Source->Urine   SEDIMENTATION RATE - Abnormal; Notable for the following components:    Sed Rate 68 (*)     All other components within normal limits   C-REACTIVE PROTEIN - Abnormal; Notable for the following components:    .7 (*)     All other components within normal limits   DRUG SCREEN PANEL, URINE EMERGENCY - Abnormal; Notable for the following components:    THC Presumptive Positive (*)     All other components within normal limits    Narrative:     Specimen Source->Urine   URINALYSIS MICROSCOPIC - Abnormal; Notable for the following components:    RBC, UA 37 (*)     WBC, UA >100 (*)     WBC Clumps, UA  Many (*)     Bacteria Many (*)     All other components within normal limits    Narrative:     Specimen Source->Urine   POCT URINE PREGNANCY - Abnormal; Notable for the following components:    POC Preg Test, Ur Positive (*)     All other components within normal limits   CULTURE, BLOOD    Narrative:     Aerobic and anaerobic   CULTURE, BLOOD    Narrative:     Aerobic and anaerobic   CULTURE, URINE   ALCOHOL,MEDICAL (ETHANOL)   HCG, QUANTITATIVE    Narrative:     Release to patient->Immediate   SARS-COV-2 RDRP GENE   POCT INFLUENZA A/B MOLECULAR   ISTAT LACTATE   POCT GLUCOSE          Imaging Results              X-Ray Chest AP Portable (Final result)  Result time 12/26/23 04:06:04      Final result by Bubba Leija MD (12/26/23 04:06:04)                   Impression:      No consolidation.  The poor inspiratory result makes it difficult to exclude mild interstitial or congestive pulmonary changes.  Correlate clinically, and with follow-up films when clinically appropriate.      Electronically signed by: Bubba Leija  Date:    12/26/2023  Time:    04:06               Narrative:    EXAMINATION:  XR CHEST AP PORTABLE    CLINICAL HISTORY:  Sepsis;    TECHNIQUE:  Single frontal view of the chest was performed.    COMPARISON:  Portable chest x-ray 08/18/2020 and 06/08/2019    FINDINGS:  Midline thoracic trachea, allowing for leftward rotation of the patient's torso.    Non-enlarged cardiopericardial silhouette.    No consolidation, discrete pneumothorax, blunting of either lateral costophrenic angle, or acute osseous or upper abdominal abnormality is apparent radiographically.    The suboptimal inspiratory result possibly exaggerates the radiographic appearance of the pulmonary bronchovascular markings.  Some underlying congestive or interstitial changes are not excluded.    Small, dense, predominately perihilar, nodular opacities most likely represent pulmonary vessels in axial projection.    Mild bilateral  metallic nipple piercings.                                       Medications   lactated ringers bolus 1,710 mL (0 mLs Intravenous Stopped 12/26/23 0821)   ondansetron injection 4 mg (4 mg Intravenous Given 12/26/23 0404)   LIDOcaine 2%/EPINEPHrine 1:100,000 injection 10 mL (10 mLs Intradermal Given by Provider 12/26/23 0510)   morphine injection 4 mg (4 mg Intravenous Given 12/26/23 0404)   acetaminophen tablet 1,000 mg (1,000 mg Oral Given 12/26/23 0427)   potassium chloride SA CR tablet 40 mEq (40 mEq Oral Given 12/26/23 0451)     Medical Decision Making  Patient presents for emergent evaluation of fever with abscess. Workup today consistent with likely pilonidal abscess, urinary tract infection, early pregnancy.  Differential includes abscess, cellulitis, alternate fever source. Doubt sepsis, doubt zhanna's, deep space infection, necrotizing infection.  Patient is nontoxic and well appearing, febrile and tachycardic.Notable pilonidal abscess with minimal surrounding cellulitic change. Benign abdomen, chest, lungs cta.   Labs were ordered and documented in the ED course.  Notable for leukocytosis, normal lactate, elevated crp/esr. UA with e/o UTI. Mild hypokalemia.  CT pelvis considered however doubt deep space infection based on exam today. No perirectal/perianal pain. CXR unremarkable.  EKG nonischemic.  I discussed the patient case with pharmacy for antibiotic recommendations. No available IV MRSA coverage.    The treatment they received in the emergency department included 30ml/kg IBW LR, zofran, morphine. I performed an incision and drainage with deloculation and packing. I performed critical care time. Considered IV ABX for MRSA coverage however in consultation with pharmacy, no safe MRSA coverage IV abx available. Will initiate on outpatient clindamycin and keflex for treatment of cellulitis/abscess and UTI. Initiate on prenatal vitamins. Consult placed for OB. Patient to return in 2 days for packing  removal/wound check.    Given patient presentation and workup, doubt emergent or surgical pathology necessitating consultation or admission from the emergency department.  I discussed the findings with the patient.  I discussed strict and strong return precautions. They will be discharged home in stable condition.      Amount and/or Complexity of Data Reviewed  Labs: ordered. Decision-making details documented in ED Course.  Radiology: ordered. Decision-making details documented in ED Course.    Risk  OTC drugs.  Prescription drug management.               ED Course as of 12/26/23 1507   Tue Dec 26, 2023   0327 BP: 130/74 [AN]   0327 Temp: 98.8 °F (37.1 °C) [AN]   0327 Pulse(!): 124 [AN]   0327 Resp: 20 [AN]   0327 SpO2: 99 %  Given tachycardia with known abscess, code sepsis initiated at triage. [AN]   0400 Preg Test, Ur(!): Positive  Patient with recent miscarriage 2 months ago.  Suspect this is new early pregnancy.  Informed patient.  LMP 12/18/2023 [AN]   0402 WBC(!): 15.73 [AN]   0402 CBC auto differential(!)  Leukocytosis to 15,000. Mild hemodynamically insignificant anemia.  No thrombocytopenia. [AN]   0412 POC Lactate: 1.01 [AN]   0418 EKG independently interpreted by me.  Demonstrates sinus tachycardia rate of 112.  Right axis, normal intervals.  No ST elevation, ST depression T-wave inversion to suggest ischemia. [AN]   0421 POCT Glucose: 103 [AN]   0421 POCT Influenza A/B Molecular [AN]   0421 POCT COVID-19 Rapid Screening [AN]   0421 Alcohol, Serum: <10 [AN]   0442 WBC, UA(!): >100 [AN]   0442 Leukocytes, UA(!): 3+ [AN]   0444 CRP(!): 210.7 [AN]   0445 HCG Quant: 302 [AN]   0458 X-Ray Chest AP Portable  I reviewed the images as well as the radiologist interpretation.  See no evidence of acute cardiopulmonary abnormality. [AN]   0515 Sed Rate(!): 68 [AN]   0532 Marijuana (THC) Metabolite(!): Presumptive Positive [AN]      ED Course User Index  [AN] Jose Hannon PA-C                           Clinical  Impression:  Final diagnoses:  [L03.317] Cellulitis of buttock  [L05.01] Pilonidal abscess (Primary)  [N30.01] Acute cystitis with hematuria  [Z34.91] First trimester pregnancy          ED Disposition Condition    Discharge Stable          ED Prescriptions       Medication Sig Dispense Start Date End Date Auth. Provider    clindamycin (CLEOCIN) 300 MG capsule Take 1 capsule (300 mg total) by mouth every 8 (eight) hours. for 10 days 30 capsule 12/26/2023 1/5/2024 Jose Hannon PA-C    cephALEXin (KEFLEX) 500 MG capsule Take 1 capsule (500 mg total) by mouth 4 (four) times daily. for 5 days 20 capsule 12/26/2023 12/31/2023 Jose Hannon PA-C    acetaminophen (TYLENOL) 500 MG tablet Take 1 tablet (500 mg total) by mouth every 6 (six) hours as needed for Pain. 20 tablet 12/26/2023 12/31/2023 Jose Hannon PA-C    prenatal 21-iron fu-folic acid (PRENATAL COMPLETE) 14 mg iron- 400 mcg Tab Take 1 tablet by mouth once daily. 30 tablet 12/26/2023 12/25/2024 Jose Hannon PA-C          Follow-up Information       Follow up With Specialties Details Why Contact [facility] - Emergency Dept Emergency Medicine Go to  As needed, If symptoms worsen [id number] [facility] [place] [id number] [phone]    Primary Care Manager  Schedule an appointment as soon as possible for a visit in 1 week               Jose Hannon PA-C  12/26/23 [id number]

## 2023-12-26 NOTE — Clinical Note
"Elaine HOUSERJOSEPH Ortiz was seen and treated in our emergency department on 12/26/2023.  She may return to work on 12/29/2023.       If you have any questions or concerns, please don't hesitate to call.      Jose Hannon PA-C"

## 2023-12-28 LAB — BACTERIA UR CULT: ABNORMAL

## 2023-12-29 ENCOUNTER — HOSPITAL ENCOUNTER (EMERGENCY)
Facility: OTHER | Age: 23
Discharge: HOME | End: 2023-12-29
Attending: EMERGENCY MEDICINE
Payer: MEDICAID

## 2023-12-29 VITALS
OXYGEN SATURATION: 99 % | WEIGHT: 200 LBS | BODY MASS INDEX: 33.28 KG/M2 | HEART RATE: 90 BPM | TEMPERATURE: 99 F | RESPIRATION RATE: 18 BRPM | DIASTOLIC BLOOD PRESSURE: 56 MMHG | SYSTOLIC BLOOD PRESSURE: 112 MMHG

## 2023-12-29 DIAGNOSIS — R10.84: Primary | ICD-10-CM

## 2023-12-29 DIAGNOSIS — O26.899: Primary | ICD-10-CM

## 2023-12-29 DIAGNOSIS — N10 ACUTE PYELONEPHRITIS: ICD-10-CM

## 2023-12-29 LAB
ALBUMIN SERPL BCP-MCNC: 2.7 G/DL (ref 3.5–5.2)
ALP SERPL-CCNC: 86 U/L (ref 55–135)
ALT SERPL W/O P-5'-P-CCNC: 21 U/L (ref 10–44)
ANION GAP SERPL CALC-SCNC: 12 MMOL/L (ref 8–16)
AST SERPL-CCNC: 20 U/L (ref 10–40)
BACTERIA #/AREA URNS HPF: ABNORMAL /HPF
BASOPHILS # BLD AUTO: 0.05 K/UL (ref 0–0.2)
BASOPHILS NFR BLD: 0.3 % (ref 0–1.9)
BILIRUB SERPL-MCNC: 0.5 MG/DL (ref 0.1–1)
BILIRUB UR QL STRIP: NEGATIVE
BUN SERPL-MCNC: 9 MG/DL (ref 6–20)
CALCIUM SERPL-MCNC: 9.2 MG/DL (ref 8.7–10.5)
CHLORIDE SERPL-SCNC: 105 MMOL/L (ref 95–110)
CLARITY UR: ABNORMAL
CO2 SERPL-SCNC: 18 MMOL/L (ref 23–29)
COLOR UR: YELLOW
CREAT SERPL-MCNC: 0.9 MG/DL (ref 0.5–1.4)
DIFFERENTIAL METHOD BLD: ABNORMAL
EOSINOPHIL # BLD AUTO: 0 K/UL (ref 0–0.5)
EOSINOPHIL NFR BLD: 0.1 % (ref 0–8)
ERYTHROCYTE [DISTWIDTH] IN BLOOD BY AUTOMATED COUNT: 16.9 % (ref 11.5–14.5)
EST. GFR  (NO RACE VARIABLE): >60 ML/MIN/1.73 M^2
GLUCOSE SERPL-MCNC: 110 MG/DL (ref 70–110)
GLUCOSE UR QL STRIP: NEGATIVE
HCG INTACT+B SERPL-ACNC: 500 MIU/ML
HCT VFR BLD AUTO: 34.4 % (ref 37–48.5)
HCV AB SERPL QL IA: NEGATIVE
HGB BLD-MCNC: 11.1 G/DL (ref 12–16)
HGB UR QL STRIP: ABNORMAL
HIV 1+2 AB+HIV1 P24 AG SERPL QL IA: NEGATIVE
HYALINE CASTS #/AREA URNS LPF: 0 /LPF
IMM GRANULOCYTES # BLD AUTO: 0.1 K/UL (ref 0–0.04)
IMM GRANULOCYTES NFR BLD AUTO: 0.6 % (ref 0–0.5)
KETONES UR QL STRIP: ABNORMAL
LACTATE SERPL-SCNC: 0.7 MMOL/L (ref 0.5–2.2)
LACTATE SERPL-SCNC: 2.4 MMOL/L (ref 0.5–2.2)
LEUKOCYTE ESTERASE UR QL STRIP: ABNORMAL
LYMPHOCYTES # BLD AUTO: 1.6 K/UL (ref 1–4.8)
LYMPHOCYTES NFR BLD: 8.9 % (ref 18–48)
MCH RBC QN AUTO: 25.1 PG (ref 27–31)
MCHC RBC AUTO-ENTMCNC: 32.3 G/DL (ref 32–36)
MCV RBC AUTO: 78 FL (ref 82–98)
MICROSCOPIC COMMENT: ABNORMAL
MONOCYTES # BLD AUTO: 1.5 K/UL (ref 0.3–1)
MONOCYTES NFR BLD: 8.3 % (ref 4–15)
NEUTROPHILS # BLD AUTO: 14.5 K/UL (ref 1.8–7.7)
NEUTROPHILS NFR BLD: 81.8 % (ref 38–73)
NITRITE UR QL STRIP: POSITIVE
NRBC BLD-RTO: 0 /100 WBC
PH UR STRIP: 6 [PH] (ref 5–8)
PLATELET # BLD AUTO: 295 K/UL (ref 150–450)
PMV BLD AUTO: 12 FL (ref 9.2–12.9)
POTASSIUM SERPL-SCNC: 3.2 MMOL/L (ref 3.5–5.1)
PROT SERPL-MCNC: 7.9 G/DL (ref 6–8.4)
PROT UR QL STRIP: ABNORMAL
RBC # BLD AUTO: 4.43 M/UL (ref 4–5.4)
RBC #/AREA URNS HPF: 5 /HPF (ref 0–4)
SODIUM SERPL-SCNC: 135 MMOL/L (ref 136–145)
SP GR UR STRIP: 1.02 (ref 1–1.03)
SQUAMOUS #/AREA URNS HPF: 4 /HPF
URN SPEC COLLECT METH UR: ABNORMAL
UROBILINOGEN UR STRIP-ACNC: NEGATIVE EU/DL
WBC # BLD AUTO: 17.75 K/UL (ref 3.9–12.7)
WBC #/AREA URNS HPF: >100 /HPF (ref 0–5)

## 2023-12-29 PROCEDURE — 84702 CHORIONIC GONADOTROPIN TEST: CPT | Performed by: EMERGENCY MEDICINE

## 2023-12-29 PROCEDURE — 85025 COMPLETE CBC W/AUTO DIFF WBC: CPT | Performed by: EMERGENCY MEDICINE

## 2023-12-29 PROCEDURE — 87186 SC STD MICRODIL/AGAR DIL: CPT | Performed by: EMERGENCY MEDICINE

## 2023-12-29 PROCEDURE — 96361 HYDRATE IV INFUSION ADD-ON: CPT

## 2023-12-29 PROCEDURE — 96374 THER/PROPH/DIAG INJ IV PUSH: CPT

## 2023-12-29 PROCEDURE — 87086 URINE CULTURE/COLONY COUNT: CPT | Performed by: EMERGENCY MEDICINE

## 2023-12-29 PROCEDURE — 81000 URINALYSIS NONAUTO W/SCOPE: CPT | Performed by: EMERGENCY MEDICINE

## 2023-12-29 PROCEDURE — 80053 COMPREHEN METABOLIC PANEL: CPT | Performed by: EMERGENCY MEDICINE

## 2023-12-29 PROCEDURE — 87389 HIV-1 AG W/HIV-1&-2 AB AG IA: CPT | Performed by: EMERGENCY MEDICINE

## 2023-12-29 PROCEDURE — 96375 TX/PRO/DX INJ NEW DRUG ADDON: CPT

## 2023-12-29 PROCEDURE — 86803 HEPATITIS C AB TEST: CPT | Performed by: EMERGENCY MEDICINE

## 2023-12-29 PROCEDURE — 63600175 PHARM REV CODE 636 W HCPCS: Performed by: EMERGENCY MEDICINE

## 2023-12-29 PROCEDURE — 99284 EMERGENCY DEPT VISIT MOD MDM: CPT | Mod: 25

## 2023-12-29 PROCEDURE — 25000003 PHARM REV CODE 250: Performed by: EMERGENCY MEDICINE

## 2023-12-29 PROCEDURE — 87077 CULTURE AEROBIC IDENTIFY: CPT | Performed by: EMERGENCY MEDICINE

## 2023-12-29 PROCEDURE — 83605 ASSAY OF LACTIC ACID: CPT | Mod: 91 | Performed by: EMERGENCY MEDICINE

## 2023-12-29 PROCEDURE — 87088 URINE BACTERIA CULTURE: CPT | Performed by: EMERGENCY MEDICINE

## 2023-12-29 RX ORDER — CEFTRIAXONE 500 MG/1
1000 INJECTION, POWDER, FOR SOLUTION INTRAMUSCULAR; INTRAVENOUS
Status: COMPLETED | OUTPATIENT
Start: 2023-12-29 | End: 2023-12-29

## 2023-12-29 RX ORDER — ONDANSETRON 2 MG/ML
4 INJECTION INTRAMUSCULAR; INTRAVENOUS
Status: COMPLETED | OUTPATIENT
Start: 2023-12-29 | End: 2023-12-29

## 2023-12-29 RX ORDER — ONDANSETRON 4 MG/1
4 TABLET, ORALLY DISINTEGRATING ORAL EVERY 8 HOURS PRN
Qty: 15 TABLET | Refills: 0 | Status: SHIPPED | OUTPATIENT
Start: 2023-12-29

## 2023-12-29 RX ORDER — ACETAMINOPHEN 325 MG/1
650 TABLET ORAL
Status: COMPLETED | OUTPATIENT
Start: 2023-12-29 | End: 2023-12-29

## 2023-12-29 RX ORDER — CEPHALEXIN 500 MG/1
500 CAPSULE ORAL 4 TIMES DAILY
Qty: 24 CAPSULE | Refills: 0 | Status: SHIPPED | OUTPATIENT
Start: 2023-12-29 | End: 2024-01-04

## 2023-12-29 RX ADMIN — SODIUM CHLORIDE 1000 ML: 9 INJECTION, SOLUTION INTRAVENOUS at 02:12

## 2023-12-29 RX ADMIN — CEFTRIAXONE SODIUM 1000 MG: 500 INJECTION, POWDER, FOR SOLUTION INTRAMUSCULAR; INTRAVENOUS at 11:12

## 2023-12-29 RX ADMIN — ACETAMINOPHEN 650 MG: 325 TABLET, FILM COATED ORAL at 02:12

## 2023-12-29 RX ADMIN — ONDANSETRON HYDROCHLORIDE 4 MG: 2 INJECTION INTRAMUSCULAR; INTRAVENOUS at 11:12

## 2023-12-29 RX ADMIN — SODIUM CHLORIDE 1000 ML: 0.9 INJECTION, SOLUTION INTRAVENOUS at 11:12

## 2023-12-29 NOTE — ED PROVIDER NOTES
"Encounter Date: 12/29/2023    SCRIBE #1 NOTE: IAmador, gini scribing for, and in the presence of,  Chacho Castañeda MD. I have scribed the following portions of the note - Other sections scribed: HPI, ROS, PE.       History     Chief Complaint   Patient presents with    Abdominal Pain     Abd pain, N/V/D, fever, recent positive UPT. Seen at  ED for similar s/s and d/c'd.      Time seen by provider: 11:35 AM    This is a 23 y.o. female, with history of depression and drug abuse as well as current early pregnancy with unknown LMP, who presents with complaint of epigastric to right upper abdominal pain for two to three days. She was seen at [facility] three days ago for a pilonidal abscess which was drained and packed. Patient was also informed she had a UTI. While she was discharged with antibiotics, she was unable to fill the prescription. Since discharge she has had aching abdominal and right flank pain, urinary incontinence, subjective fever, and runny nose.  Patient also states she has had nausea and vomiting of all p.o. intake for the past few days.  Patient states that she does not feel the urge to urinate, "it just happens" even though she is unaware of it occurring. This is the extent of the patient's complaints at this time.     The history is provided by the patient.     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 [facility] and [facility] of psychiatric care     Panic disorder     Psychiatric exam requested by authority     Psychiatric problem     PTSD (post-traumatic stress disorder)     Substance abuse     Suicide attempt     Therapy     Withdrawal symptoms, drug or narcotic      No past surgical history on file.  No family history on " file.  Social History     Tobacco Use    Smoking status: Never    Smokeless tobacco: Never   Substance Use Topics    Alcohol use: Yes    Drug use: Not Currently     Types: Oxycodone     Comment: quit 4 wks ago     Review of Systems   Constitutional:  Positive for fever.   HENT:  Positive for rhinorrhea. Negative for congestion.    Eyes:  Negative for redness.   Respiratory:  Negative for shortness of breath.    Cardiovascular:  Negative for leg swelling.   Gastrointestinal:  Positive for abdominal pain, nausea and vomiting.   Genitourinary:  Positive for flank pain. Negative for dysuria.   Skin:  Negative for rash.   Neurological:  Negative for headaches.   Psychiatric/Behavioral:  Negative for confusion.        Physical Exam     Initial Vitals [12/29/23 1030]   BP Pulse Resp Temp SpO2   (!) 105/56 (!) 122 17 99.2 °F (37.3 °C) 97 %      MAP       --         Physical Exam    Constitutional: She appears well-developed and well-nourished. She is not diaphoretic. No distress.   HENT:   Head: Normocephalic and atraumatic.   Mouth/Throat: Mucous membranes are dry.   Eyes: Conjunctivae are normal.   Neck: Neck supple.   Cardiovascular:  Regular rhythm, S1 normal, S2 normal, normal heart sounds and intact distal pulses.   Tachycardia present.         No murmur heard.  Pulmonary/Chest: Breath sounds normal. No respiratory distress. She has no wheezes. She has no rhonchi. She has no rales.   Abdominal: Abdomen is soft. There is no abdominal tenderness.   No focal abdominal tenderness.   No right CVA tenderness.  There is left CVA tenderness. There is no rebound and no guarding.   Musculoskeletal:         General: No edema.      Cervical back: Neck supple.     Neurological: She is alert and oriented to person, place, and time.   Skin: Skin is warm and dry.   Psychiatric: She has a normal mood and affect.         ED Course   Procedures  Labs Reviewed   COMPREHENSIVE METABOLIC PANEL - Abnormal; Notable for the following  components:       Result Value    Sodium 135 (*)     Potassium 3.2 (*)     CO2 18 (*)     Albumin 2.7 (*)     All other components within normal limits    Narrative:     Release to patient->Immediate   CBC W/ AUTO DIFFERENTIAL - Abnormal; Notable for the following components:    WBC 17.75 (*)     Hemoglobin 11.1 (*)     Hematocrit 34.4 (*)     MCV 78 (*)     MCH 25.1 (*)     RDW 16.9 (*)     Immature Granulocytes 0.6 (*)     Gran # (ANC) 14.5 (*)     Immature Grans (Abs) 0.10 (*)     Mono # 1.5 (*)     Gran % 81.8 (*)     Lymph % 8.9 (*)     All other components within normal limits    Narrative:     Release to patient->Immediate   URINALYSIS, REFLEX TO URINE CULTURE - Abnormal; Notable for the following components:    Appearance, UA Hazy (*)     Protein, UA 2+ (*)     Ketones, UA Trace (*)     Occult Blood UA Trace (*)     Nitrite, UA Positive (*)     Leukocytes, UA 3+ (*)     All other components within normal limits    Narrative:     Specimen Source->Urine   LACTIC ACID, PLASMA - Abnormal; Notable for the following components:    Lactate (Lactic Acid) 2.4 (*)     All other components within normal limits    Narrative:     Release to patient->Immediate   URINALYSIS MICROSCOPIC - Abnormal; Notable for the following components:    RBC, UA 5 (*)     WBC, UA >100 (*)     Bacteria Many (*)     All other components within normal limits    Narrative:     Specimen Source->Urine   CULTURE, URINE   HIV 1 / 2 ANTIBODY    Narrative:     Release to patient->Immediate   HEPATITIS C ANTIBODY    Narrative:     Release to patient->Immediate   HCG, QUANTITATIVE    Narrative:     Release to patient->Immediate   LACTIC ACID, PLASMA   POCT URINE PREGNANCY          Imaging Results              US OB <14 Wks TransAbd & TransVag, Single Gestation (XPD) (Final result)  Result time 12/29/23 13:31:35      Final result by Slava Cortez MD (12/29/23 13:31:35)                   Impression:      Suspected single tiny intrauterine gestational  sac which is too small for dates, noting no yolk sac or fetal pole identified at this time likely secondary to very early gestation.  No extra uterine gestational sac or free fluid in the pelvis, noting ectopic pregnancy not excluded on the basis of this examination.  Follow-up with serial beta HCG and/or pelvic ultrasound recommended to diagnosis confirmation.      Electronically signed by: Slava Cortez MD  Date:    12/29/2023  Time:    13:31               Narrative:    EXAMINATION:  US OB <14 WEEKS, TRANSABDOM & TRANSVAG, SINGLE GESTATION (XPD)    CLINICAL HISTORY:  Abdominal pain early pregnancy;    TECHNIQUE:  Transabdominal sonography of the pelvis was performed, followed by transvaginal sonography to better evaluate the uterus and ovaries.    COMPARISON:  None available    FINDINGS:  Uterus: 6.8 x 2.5 x 5.2 cm.  No discrete fibroid.  There is a 0.3 x 0.4 x 0.2 cm anechoic structure with echogenic rim seen within the upper endometrium which may reflect a tiny gestational sac, too small for dates.  No yolk sac or fetal pole identified.  No extra uterine gestational sac seen.    Right ovary: 4 x 3.1 x 2.3 cm with intact arterial and venous flow.    Left ovary: 3.2 x 2.9 x 1.8 cm with intact arterial and venous flow.  At the left adnexa there is a 1.3 x 1.5 x 0.8 cm well-circumscribed anechoic nonvascular structure suggestive of a simple paraovarian cyst.    Miscellaneous: No Free Fluid.                                       Medications   sodium chloride 0.9% bolus 1,000 mL 1,000 mL (0 mLs Intravenous Stopped 12/29/23 1300)   ondansetron injection 4 mg (4 mg Intravenous Given 12/29/23 1145)   cefTRIAXone injection 1,000 mg (1,000 mg Intravenous Given 12/29/23 1154)   sodium chloride 0.9% bolus 1,000 mL 1,000 mL (0 mLs Intravenous Stopped 12/29/23 1530)   acetaminophen tablet 650 mg (650 mg Oral Given 12/29/23 1415)     Medical Decision Making      23-year-old female presents for evaluation of worsening myalgias  and vomiting.  She was seen at [facility] 3 days ago for pilonidal abscess that required I and D, and she was also found to be pregnant at that time with a UTI.  She was discharged with clindamycin and Keflex but has not fill these prescriptions yet, also endorses persistent nausea and vomiting of all p.o. intake since then.  She also has had diffuse myalgias and urinary incontinence, with lower abdominal and back pains as well.  She denies any vaginal bleeding or discharge, no current urinary symptoms or other complaints.  On arrival patient with mild tachycardia and temp 99.2°.  Pilonidal abscess site appears to be well healing, packing removed without complication, no active purulent drainage or surrounding erythema.  Patient's seems to have persistent infectious source based on vital signs on arrival, but more likely related to untreated UTI with possibly developing pyelonephritis and she also has left CVA tenderness.  However given early pregnancy and abdominal pain will also get ultrasound to rule out less likely ectopic pregnancy.      Initial labs with WBC 17.7, mild hypokalemia but otherwise normal CMP.  Lactate initially elevated at 2.4.  UA still consistent with UTI with nitrite positive and greater than 100 wbc's, confirming suspected UTI/left pyelonephritis as source of fever; patient did spike temp of 101.6° while in ED. beta-hCG was 500, and ultrasound shows suspected single tiny intrauterine gestational sac with no yolk sac or fetal pole identified yet, but no evidence for ectopic pregnancy.  After fluids, Zofran patient feels better and tolerating liquids without difficulty, also treated with ceftriaxone for pyelonephritis.  Based on early pregnancy with pyelonephritis and outpatient nausea/vomiting limiting her ability to take oral antibiotics, I advised admission with IV antibiotics.  However, patient states that she has a young daughter at home she needs to care for, can not be admitted at this  time, wants another trial of oral antibiotics.  She no longer needs clindamycin prescribed since pilonidal abscess appears to be healing well without it, but I extensively counseled her on need to fill her Keflex Rx and be compliant with this; she understands potential risks of being discharged including worsening UTI/pyelonephritis and possible sepsis, pregnancy loss, disability and death.  She expresses understanding and will return to the ED if unable to get or take antibiotics, will also Rx Zofran as well, and she understands to only take Tylenol for pain and start prenatal vitamins as well.  She is discharged in stable condition, afebrile with normal vitals on reassessment with no active nausea.        Amount and/or Complexity of Data Reviewed  Labs: ordered. Decision-making details documented in ED Course.  Radiology: ordered.    Risk  OTC drugs.  Prescription drug management.            Scribe Attestation:   Scribe #1: I performed the above scribed service and the documentation accurately describes the services I performed. I attest to the accuracy of the note.    I, Dr. Chacho Castañeda, personally performed the services described in this documentation. All medical record entries made by the scribe were at my direction and in my presence.  I have reviewed the chart and agree that the record reflects my personal performance and is accurate and complete. Chacho Castañeda MD.                                  Clinical Impression:  Final diagnoses:  [O26.899, R10.84] Generalized abdominal pain during pregnancy (Primary)  [N10] Acute pyelonephritis          ED Disposition Condition    Discharge Stable          ED Prescriptions       Medication Sig Dispense Start Date End Date Auth. Provider    cephALEXin (KEFLEX) 500 MG capsule Take 1 capsule (500 mg total) by mouth 4 (four) times daily. for 6 days 24 capsule 12/29/2023 1/4/2024 Chacho Castañeda MD    ondansetron (ZOFRAN-ODT) 4 MG TbDL Take 1 tablet (4 mg  total) by mouth every 8 (eight) hours as needed (Nausea). 15 tablet 12/29/2023 -- Chacho Castañeda MD          Follow-up Information       Follow up With Specialties Details Why Contact Info    [facility] Emergency Dept Emergency Medicine Go to  If symptoms worsen [address]  [facility] [address]  [phone]             Chacho Castañeda MD  12/30/23 7838

## 2023-12-29 NOTE — Clinical Note
"Deidra AUGUSTE"CAMELIA Ortiz was seen and treated in our emergency department on 12/29/2023.  She may return to work on 01/02/2024.       If you have any questions or concerns, please don't hesitate to call.      Chacho Castañeda MD"

## 2023-12-29 NOTE — ED TRIAGE NOTES
Pt arrived to ED complaining of abdominal pain with nausea vomiting and diarrhea. Pt was seen at [facility] on 12/26 for same symptoms and discovered a + UPT, pt recently had a miscarriage in October.

## 2023-12-30 LAB
BACTERIA BLD CULT: NORMAL
BACTERIA BLD CULT: NORMAL

## 2023-12-31 LAB — BACTERIA UR CULT: ABNORMAL

## 2024-03-11 ENCOUNTER — HOSPITAL ENCOUNTER (EMERGENCY)
Facility: HOSPITAL | Age: 24
Discharge: LEFT BEFORE DISCHARGE | End: 2024-03-11
Payer: MEDICAID

## 2024-03-11 VITALS
HEIGHT: 65 IN | DIASTOLIC BLOOD PRESSURE: 90 MMHG | HEART RATE: 108 BPM | OXYGEN SATURATION: 98 % | RESPIRATION RATE: 16 BRPM | BODY MASS INDEX: 29.16 KG/M2 | WEIGHT: 175 LBS | SYSTOLIC BLOOD PRESSURE: 130 MMHG | TEMPERATURE: 99 F

## 2024-03-11 LAB
B-HCG UR QL: NEGATIVE
CTP QC/QA: YES

## 2024-03-11 PROCEDURE — 81025 URINE PREGNANCY TEST: CPT | Performed by: EMERGENCY MEDICINE

## 2024-03-11 PROCEDURE — 99900041 HC LEFT WITHOUT BEING SEEN- EMERGENCY

## 2024-09-21 ENCOUNTER — HOSPITAL ENCOUNTER (EMERGENCY)
Facility: OTHER | Age: 24
Discharge: HOME | End: 2024-09-21
Attending: EMERGENCY MEDICINE
Payer: MEDICAID

## 2024-09-21 VITALS
HEIGHT: 65 IN | WEIGHT: 180 LBS | SYSTOLIC BLOOD PRESSURE: 122 MMHG | DIASTOLIC BLOOD PRESSURE: 61 MMHG | HEART RATE: 88 BPM | OXYGEN SATURATION: 99 % | BODY MASS INDEX: 29.99 KG/M2 | RESPIRATION RATE: 18 BRPM | TEMPERATURE: 98 F

## 2024-09-21 DIAGNOSIS — M79.606 LEG PAIN: Primary | ICD-10-CM

## 2024-09-21 LAB
B-HCG UR QL: NEGATIVE
CTP QC/QA: YES

## 2024-09-21 PROCEDURE — 81025 URINE PREGNANCY TEST: CPT | Performed by: EMERGENCY MEDICINE

## 2024-09-21 PROCEDURE — 63600175 PHARM REV CODE 636 W HCPCS: Performed by: EMERGENCY MEDICINE

## 2024-09-21 PROCEDURE — 25000003 PHARM REV CODE 250: Performed by: EMERGENCY MEDICINE

## 2024-09-21 PROCEDURE — 99284 EMERGENCY DEPT VISIT MOD MDM: CPT | Mod: 25

## 2024-09-21 PROCEDURE — 96372 THER/PROPH/DIAG INJ SC/IM: CPT | Performed by: EMERGENCY MEDICINE

## 2024-09-21 RX ORDER — METHOCARBAMOL 500 MG/1
1000 TABLET, FILM COATED ORAL 3 TIMES DAILY PRN
Qty: 20 TABLET | Refills: 0 | Status: SHIPPED | OUTPATIENT
Start: 2024-09-21 | End: 2024-09-26

## 2024-09-21 RX ORDER — METHOCARBAMOL 500 MG/1
1000 TABLET, FILM COATED ORAL
Status: COMPLETED | OUTPATIENT
Start: 2024-09-21 | End: 2024-09-21

## 2024-09-21 RX ORDER — IBUPROFEN 800 MG/1
800 TABLET ORAL EVERY 6 HOURS PRN
Qty: 20 TABLET | Refills: 0 | Status: SHIPPED | OUTPATIENT
Start: 2024-09-21

## 2024-09-21 RX ORDER — KETOROLAC TROMETHAMINE 30 MG/ML
30 INJECTION, SOLUTION INTRAMUSCULAR; INTRAVENOUS
Status: COMPLETED | OUTPATIENT
Start: 2024-09-21 | End: 2024-09-21

## 2024-09-21 RX ADMIN — KETOROLAC TROMETHAMINE 30 MG: 30 INJECTION, SOLUTION INTRAMUSCULAR; INTRAVENOUS at 02:09

## 2024-09-21 RX ADMIN — METHOCARBAMOL 1000 MG: 500 TABLET ORAL at 02:09
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
## 2024-10-14 ENCOUNTER — HOSPITAL ENCOUNTER (EMERGENCY)
Facility: OTHER | Age: 24
Discharge: HOME | End: 2024-10-14
Attending: EMERGENCY MEDICINE
Payer: MEDICAID

## 2024-10-14 VITALS
DIASTOLIC BLOOD PRESSURE: 73 MMHG | WEIGHT: 185 LBS | SYSTOLIC BLOOD PRESSURE: 138 MMHG | BODY MASS INDEX: 30.79 KG/M2 | OXYGEN SATURATION: 100 % | TEMPERATURE: 98 F | RESPIRATION RATE: 20 BRPM | HEART RATE: 78 BPM

## 2024-10-14 DIAGNOSIS — R51.9 NONINTRACTABLE EPISODIC HEADACHE, UNSPECIFIED HEADACHE TYPE: ICD-10-CM

## 2024-10-14 DIAGNOSIS — R42 DIZZINESS: Primary | ICD-10-CM

## 2024-10-14 DIAGNOSIS — F07.81 POST CONCUSSION SYNDROME: ICD-10-CM

## 2024-10-14 LAB
B-HCG UR QL: NEGATIVE
CTP QC/QA: YES

## 2024-10-14 PROCEDURE — 99284 EMERGENCY DEPT VISIT MOD MDM: CPT | Mod: 25

## 2024-10-14 PROCEDURE — 25000003 PHARM REV CODE 250: Performed by: PHYSICIAN ASSISTANT

## 2024-10-14 PROCEDURE — 25000003 PHARM REV CODE 250: Performed by: NURSE PRACTITIONER

## 2024-10-14 PROCEDURE — 81025 URINE PREGNANCY TEST: CPT | Performed by: NURSE PRACTITIONER

## 2024-10-14 RX ORDER — ACETAMINOPHEN 500 MG
1000 TABLET ORAL
Status: COMPLETED | OUTPATIENT
Start: 2024-10-14 | End: 2024-10-14

## 2024-10-14 RX ORDER — MECLIZINE HYDROCHLORIDE 25 MG/1
25 TABLET ORAL 3 TIMES DAILY PRN
Qty: 20 TABLET | Refills: 0 | Status: SHIPPED | OUTPATIENT
Start: 2024-10-14

## 2024-10-14 RX ORDER — MECLIZINE HYDROCHLORIDE 25 MG/1
50 TABLET ORAL
Status: COMPLETED | OUTPATIENT
Start: 2024-10-14 | End: 2024-10-14

## 2024-10-14 RX ORDER — LIDOCAINE 50 MG/G
1 PATCH TOPICAL DAILY
Qty: 15 PATCH | Refills: 0 | Status: SHIPPED | OUTPATIENT
Start: 2024-10-14

## 2024-10-14 RX ORDER — KETOROLAC TROMETHAMINE 10 MG/1
10 TABLET, FILM COATED ORAL EVERY 6 HOURS
Qty: 12 TABLET | Refills: 0 | Status: SHIPPED | OUTPATIENT
Start: 2024-10-14 | End: 2024-10-17

## 2024-10-14 RX ADMIN — MECLIZINE HYDROCHLORIDE 50 MG: 25 TABLET ORAL at 08:10

## 2024-10-14 RX ADMIN — ACETAMINOPHEN 1000 MG: 500 TABLET, FILM COATED ORAL at 07:10
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
## 2024-10-15 NOTE — ED NOTES
Ambulated to ED room 12 with steady gait, urine collected via clean catch, no acute distress noted,
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
## 2024-10-15 NOTE — ED PROVIDER NOTES
Source of History:  Patient, [facility] paperwork and medical records    Chief complaint:  Dizziness (SAH 8/20. Has been having dizziness and posterior HA for 2 weeks. )      HPI:  Deidra Ortiz is a 24 y.o. female past medical history polysubstance abuse status post polytrauma with subarachnoid hemorrhoid in August which was stable upon discharge presents with headache.  States gradual onset.  States that is worse with positional changes in his to the generalized head.  She does describe some room spinning sensation that too is worse with positional changes.  Endorses that she has been up ambulating more frequently and believes this is contributory.  She has been trying ibuprofen with no improvement symptoms.  She denies any vision changes, nausea, vomiting, new trauma,, numbness or tingling.    This is the extent to the patients complaints today here in the emergency department.    ROS: As per HPI    Review of patient's allergies indicates:  No Known Allergies    PMH:  As per HPI and below:  Past Medical History:   Diagnosis Date    Addiction to drug     ADHD (attention deficit hyperactivity disorder)     Borderline personality disorder     Depression     History of psychiatric hospitalization     several. [email or web address] and [facility] of psychiatric care     Panic disorder     Psychiatric exam requested by authority     Psychiatric problem     PTSD (post-traumatic stress disorder)     Substance abuse     Suicide attempt     Therapy     Withdrawal symptoms, drug or narcotic      History reviewed. No pertinent surgical history.    Social History     Tobacco Use    Smoking status: Never    Smokeless tobacco: Never   Substance Use Topics    Alcohol use: Yes    Drug use: Not Currently     Types: Oxycodone     Comment: quit 4 wks ago       Physical Exam:    /73 (BP Location: Left arm, Patient Position: Sitting)   Pulse 78   Temp 97.8 °F (36.6 °C) (Oral)   Resp 20   Wt 83.9 kg (185 lb)   LMP  08/19/2024   SpO2 100%   Breastfeeding No   BMI 30.79 kg/m²   Nursing note and vital signs reviewed.  Constitutional: No acute distress.  Nontoxic  Eyes: No conjunctival injection.Extraocular muscles are intact.  Right-sided horizontal nystagmus  HENT: Oropharynx clear.  Normal phonation.  No signs of head trauma.  Cardiovascular: Regular rate and rhythm.  No murmurs. No gallops. No rubs  Respiratory: Clear to auscultation bilaterally.  Good air movement.  No wheezes.  No rhonchi. No rales. No accessory muscle use.  Abdomen: Soft.  Not distended.  Nontender.  No guarding.  No rebound. Non-peritoneal.  Musculoskeletal: Good range of motion all joints.  No deformities.  Neck supple.  No meningismus.  Skin: No rashes seen.  Good turgor.  No abrasions.  No ecchymoses.  Neurologic: Motor intact.  Sensation intact.  No ataxia. No focal neurological deficits.  Psych: Appropriate, conversant    Labs that have been ordered have been independently reviewed and interpreted by myself.    I decided to obtain the patient's medical records.      MDM/ Differential Dx:    Deidra Ortiz 24 y.o. presented to the ED with c/o headache.  Physical exam reveals well-appearing female in no significant distress.  No signs of head injury.  Slight fatigable horizontal nystagmus to the right.  No focal neuro deficit.  Has slight antalgic gait secondary to previous lower extremity fractures.    Differential Diagnosis includes, but is not limited to:  subarachnoid hemorrhage/ruptured , intracranial lesion/mass, subdural hematoma, pseudotumor cerebri, venous sinus thrombosis, CO poisoning, hypertensive encephalopathy, MI/ACS, head trauma/contusion, concussion, sinus headache, dehydration, anxiety, medication non-compliance, primary headache (tension/cluster/migraine).      ED management:  Patient seen in triage process were initial medications and CT ordered given recent head injury with known SAH which was stable upon discharge.  Given  overall symptomology suspect component of postconcussive syndrome and posttraumatic headache.  Imaging here reassuring.  Had slight improvement in symptoms in the ER.  Considered additional etiologies however low suspicion as Patient has no neck stiffness/meningismus, fever, elevated blood pressure, confusion, vision loss, temporal artery tenderness, rash, vomiting, photophobia or thunderclap onset to suggest pseudotumor cerebri, meningitis, tumor, ICH, temporal arteritis, or encephalitis. Emphasized importance of following up with concussion center will place referral.  Will send home with symptomatic medications.    Impression/Plan: Patient informed of diagnosis  The primary encounter diagnosis was Dizziness. Diagnoses of Post concussion syndrome and Nonintractable episodic headache, unspecified headache type were also pertinent to this visit.   Patient cautioned on when to return to ED.  Pt. Understands and agrees with current treatment plan      Results for orders placed or performed during the hospital encounter of 10/14/24   POCT urine pregnancy    Collection Time: 10/14/24  7:54 PM   Result Value Ref Range    POC Preg Test, Ur Negative Negative     Acceptable Yes      Imaging Results              CT Head Without Contrast (Final result)  Result time 10/14/24 21:05:41      Final result by Jacqueline Sims MD (10/14/24 21:05:41)                   Impression:      No acute intracranial abnormality detected.      Electronically signed by: Jacqueline Sims  Date:    10/14/2024  Time:    21:05               Narrative:    EXAMINATION:  CT OF THE HEAD WITHOUT    CLINICAL HISTORY:  Subarachnoid hemorrhage (SAH) suspected;    TECHNIQUE:  5 mm unenhanced axial images were obtained from the skull base to the vertex.    COMPARISON:  08/21/2024    FINDINGS:  The ventricles, basal cisterns, and cortical sulci are within normal limits for patient's stated age. There is no acute intracranial hemorrhage,  territorial infarct or mass effect, or midline shift.  There is cavum vergae.  The visualized paranasal sinuses and mastoid air cells are clear.                                       Diagnostic Impression:    1. Dizziness    2. Post concussion syndrome    3. Nonintractable episodic headache, unspecified headache type         ED Disposition Condition    Discharge Stable            ED Prescriptions       Medication Sig Dispense Start Date End Date Auth. Provider    meclizine (ANTIVERT) 25 mg tablet Take 1 tablet (25 mg total) by mouth 3 (three) times daily as needed. 20 tablet 10/14/2024 -- Hailey Pappas PA    ketorolac (TORADOL) 10 mg tablet Take 1 tablet (10 mg total) by mouth every 6 (six) hours. for 3 days 12 tablet 10/14/2024 10/17/2024 Hailey Pappas PA    LIDOcaine (LIDODERM) 5 % Place 1 patch onto the skin once daily. Remove & Discard patch within 12 hours or as directed by MD 15 patch 10/14/2024 -- Hailey Pappas PA          Follow-up Information       Follow up With Specialties Details Why Contact Info    [facility] -  Schedule an appointment as soon as possible for a visit   [address]  [facility] [address]  [phone]               Hailey Pappas PA  10/16/24 [id number]

## 2024-12-19 ENCOUNTER — HOSPITAL ENCOUNTER (EMERGENCY)
Facility: HOSPITAL | Age: 24
Discharge: HOME | End: 2024-12-19
Attending: STUDENT IN AN ORGANIZED HEALTH CARE EDUCATION/TRAINING PROGRAM
Payer: MEDICAID

## 2024-12-19 VITALS
OXYGEN SATURATION: 100 % | WEIGHT: 180.75 LBS | RESPIRATION RATE: 20 BRPM | DIASTOLIC BLOOD PRESSURE: 53 MMHG | HEIGHT: 65 IN | TEMPERATURE: 98 F | SYSTOLIC BLOOD PRESSURE: 108 MMHG | HEART RATE: 82 BPM | BODY MASS INDEX: 30.12 KG/M2

## 2024-12-19 DIAGNOSIS — O23.41 URINARY TRACT INFECTION IN MOTHER DURING FIRST TRIMESTER OF PREGNANCY: ICD-10-CM

## 2024-12-19 DIAGNOSIS — O20.0 THREATENED ABORTION: Primary | ICD-10-CM

## 2024-12-19 LAB
ABO + RH BLD: NORMAL
ALBUMIN SERPL BCP-MCNC: 4.1 G/DL (ref 3.5–5.2)
ALP SERPL-CCNC: 88 U/L (ref 40–150)
ALT SERPL W/O P-5'-P-CCNC: 14 U/L (ref 10–44)
ANION GAP SERPL CALC-SCNC: 11 MMOL/L (ref 8–16)
AST SERPL-CCNC: 12 U/L (ref 10–40)
B-HCG UR QL: POSITIVE
BACTERIA #/AREA URNS HPF: ABNORMAL /HPF
BACTERIA GENITAL QL WET PREP: ABNORMAL
BASOPHILS # BLD AUTO: 0.03 K/UL (ref 0–0.2)
BASOPHILS NFR BLD: 0.3 % (ref 0–1.9)
BILIRUB SERPL-MCNC: 0.4 MG/DL (ref 0.1–1)
BILIRUB UR QL STRIP: NEGATIVE
BUN SERPL-MCNC: 10 MG/DL (ref 6–20)
CALCIUM SERPL-MCNC: 9.6 MG/DL (ref 8.7–10.5)
CHLORIDE SERPL-SCNC: 107 MMOL/L (ref 95–110)
CLARITY UR: ABNORMAL
CLUE CELLS VAG QL WET PREP: ABNORMAL
CO2 SERPL-SCNC: 19 MMOL/L (ref 23–29)
COLOR UR: YELLOW
CREAT SERPL-MCNC: 0.7 MG/DL (ref 0.5–1.4)
CTP QC/QA: YES
DIFFERENTIAL METHOD BLD: ABNORMAL
EOSINOPHIL # BLD AUTO: 0.1 K/UL (ref 0–0.5)
EOSINOPHIL NFR BLD: 0.5 % (ref 0–8)
ERYTHROCYTE [DISTWIDTH] IN BLOOD BY AUTOMATED COUNT: 15.9 % (ref 11.5–14.5)
EST. GFR  (NO RACE VARIABLE): >60 ML/MIN/1.73 M^2
FILAMENT FUNGI VAG WET PREP-#/AREA: ABNORMAL
GLUCOSE SERPL-MCNC: 85 MG/DL (ref 70–110)
GLUCOSE UR QL STRIP: NEGATIVE
HCG INTACT+B SERPL-ACNC: NORMAL MIU/ML
HCT VFR BLD AUTO: 40.7 % (ref 37–48.5)
HGB BLD-MCNC: 13 G/DL (ref 12–16)
HGB UR QL STRIP: ABNORMAL
IMM GRANULOCYTES # BLD AUTO: 0.02 K/UL (ref 0–0.04)
IMM GRANULOCYTES NFR BLD AUTO: 0.2 % (ref 0–0.5)
KETONES UR QL STRIP: ABNORMAL
LEUKOCYTE ESTERASE UR QL STRIP: ABNORMAL
LYMPHOCYTES # BLD AUTO: 3.7 K/UL (ref 1–4.8)
LYMPHOCYTES NFR BLD: 35.7 % (ref 18–48)
MCH RBC QN AUTO: 28.1 PG (ref 27–31)
MCHC RBC AUTO-ENTMCNC: 31.9 G/DL (ref 32–36)
MCV RBC AUTO: 88 FL (ref 82–98)
MICROSCOPIC COMMENT: ABNORMAL
MONOCYTES # BLD AUTO: 0.5 K/UL (ref 0.3–1)
MONOCYTES NFR BLD: 4.9 % (ref 4–15)
NEUTROPHILS # BLD AUTO: 6 K/UL (ref 1.8–7.7)
NEUTROPHILS NFR BLD: 58.4 % (ref 38–73)
NITRITE UR QL STRIP: POSITIVE
NRBC BLD-RTO: 0 /100 WBC
PH UR STRIP: 7 [PH] (ref 5–8)
PLATELET # BLD AUTO: 318 K/UL (ref 150–450)
PMV BLD AUTO: 10.9 FL (ref 9.2–12.9)
POTASSIUM SERPL-SCNC: 3.5 MMOL/L (ref 3.5–5.1)
PROT SERPL-MCNC: 7.6 G/DL (ref 6–8.4)
PROT UR QL STRIP: ABNORMAL
RBC # BLD AUTO: 4.62 M/UL (ref 4–5.4)
RBC #/AREA URNS HPF: 3 /HPF (ref 0–4)
SODIUM SERPL-SCNC: 137 MMOL/L (ref 136–145)
SP GR UR STRIP: 1.02 (ref 1–1.03)
SPECIMEN SOURCE: ABNORMAL
SQUAMOUS #/AREA URNS HPF: 27 /HPF
T VAGINALIS GENITAL QL WET PREP: ABNORMAL
URN SPEC COLLECT METH UR: ABNORMAL
UROBILINOGEN UR STRIP-ACNC: NEGATIVE EU/DL
WBC # BLD AUTO: 10.26 K/UL (ref 3.9–12.7)
WBC #/AREA URNS HPF: 19 /HPF (ref 0–5)
WBC #/AREA VAG WET PREP: ABNORMAL
YEAST GENITAL QL WET PREP: ABNORMAL

## 2024-12-19 PROCEDURE — 80053 COMPREHEN METABOLIC PANEL: CPT | Performed by: STUDENT IN AN ORGANIZED HEALTH CARE EDUCATION/TRAINING PROGRAM

## 2024-12-19 PROCEDURE — 87210 SMEAR WET MOUNT SALINE/INK: CPT

## 2024-12-19 PROCEDURE — 87591 N.GONORRHOEAE DNA AMP PROB: CPT | Performed by: STUDENT IN AN ORGANIZED HEALTH CARE EDUCATION/TRAINING PROGRAM

## 2024-12-19 PROCEDURE — 84702 CHORIONIC GONADOTROPIN TEST: CPT | Performed by: STUDENT IN AN ORGANIZED HEALTH CARE EDUCATION/TRAINING PROGRAM

## 2024-12-19 PROCEDURE — 99283 EMERGENCY DEPT VISIT LOW MDM: CPT | Mod: 25

## 2024-12-19 PROCEDURE — 85025 COMPLETE CBC W/AUTO DIFF WBC: CPT | Performed by: STUDENT IN AN ORGANIZED HEALTH CARE EDUCATION/TRAINING PROGRAM

## 2024-12-19 PROCEDURE — 87086 URINE CULTURE/COLONY COUNT: CPT | Performed by: STUDENT IN AN ORGANIZED HEALTH CARE EDUCATION/TRAINING PROGRAM

## 2024-12-19 PROCEDURE — 81025 URINE PREGNANCY TEST: CPT | Performed by: STUDENT IN AN ORGANIZED HEALTH CARE EDUCATION/TRAINING PROGRAM

## 2024-12-19 PROCEDURE — 86901 BLOOD TYPING SEROLOGIC RH(D): CPT | Performed by: STUDENT IN AN ORGANIZED HEALTH CARE EDUCATION/TRAINING PROGRAM

## 2024-12-19 PROCEDURE — 81000 URINALYSIS NONAUTO W/SCOPE: CPT | Performed by: STUDENT IN AN ORGANIZED HEALTH CARE EDUCATION/TRAINING PROGRAM

## 2024-12-19 RX ORDER — NITROFURANTOIN 25; 75 MG/1; MG/1
100 CAPSULE ORAL 2 TIMES DAILY
Qty: 10 CAPSULE | Refills: 0 | Status: SHIPPED | OUTPATIENT
Start: 2024-12-19 | End: 2024-12-24

## 2024-12-20 NOTE — ED NOTES
Patient admitted with c/o malodorous vaginal discharge with bleeding and abdominal pain since 4 days, states she is 4 weeks pregnant. Denies chest pain, fever, chills, headache, dizziness, nausea, vomiting, diarrhea. Patient is conscious, coherent, oriented x4, alert, awake, responding to verbal commands appropriately. Connected to vitals monitor, vitals stable. IV line inserted and blood samples sent to lab as ordered. Bed kept in lowest position, breaks on , side rails up and call bell kept in reach. Patient rates pain abdomen 8/10 on numerical pain scale. Malodor noted from patient. Patient states she underwent left hip and leg and right wrist surgery recently due to injury sustained after a fall in August 2024, surgical scars noted on right wrist, left leg.

## 2024-12-20 NOTE — DISCHARGE INSTRUCTIONS
Thank you for letting me care for you today - it was nice to meet you and I hope you feel better soon. I have placed a referral to OBGYN for follow up care. You can call [phone] ([phone]) to schedule. You can also schedule on the [facility] [employer] diane.     I sent in the following medication to your pharmacy:   Macrobid: 2 times per day for 5 days    Our goal at [facility] is to always give you outstanding care and exceptional service. You may receive a survey by mail or email in the next week about your experience in our ED. We would greatly appreciate you completing and returning the survey. Your feedback provides us with a way to recognize our staff who give very good care and it helps us learn how to improve when your experience was below our aspiration of excellence.     All the best,     Mary Kate Gomez, MPH, PA-C  Emergency Department Physician Assistant  [facility] [place] [place], [place] [facility]

## 2024-12-21 LAB
BACTERIA UR CULT: NORMAL
BACTERIA UR CULT: NORMAL

## 2024-12-21 NOTE — ED PROVIDER NOTES
"Encounter Date: 12/19/2024       History     Chief Complaint   Patient presents with    Threatened Miscarriage     Pt presents to the ED c/o vaginal bleeding and abdominal cramping starting on yesterday. Pt states she is 4 weeks pregnant.      Patient is a 24 y.o. pregnant female currently presents with concern possible miscarriage.  Patient states she is approximately 4 weeks pregnant.  Reports that she had 1 episode of light vaginal spotting yesterday.  States that she is not currently bleeding and has not been soaking through pads.  She was seen yesterday at [facility] and had an ultrasound showing a "probable intrauterine gestational sac with yolk sac, though no fetal pole visualized".    Patient says that the wait time at [facility] yesterday was very long so she did not state to get her results.  There is not associated abdominal pain.  Patient denies fever, chills, NVD, lightheadedness, syncope, unusual vaginal discharge, dysuria, flank pain, urinary frequency, or any other complaints at this time.     The history is provided by the patient and medical records.     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 [facility] and [facility] of psychiatric care     Panic disorder     Psychiatric exam requested by authority     Psychiatric problem     PTSD (post-traumatic stress disorder)     Substance abuse     Suicide attempt     Therapy     Withdrawal symptoms, drug or narcotic      History reviewed. No pertinent surgical history.  No family history on file.  Social History     Tobacco Use    Smoking status: Never    Smokeless tobacco: Never   Substance Use Topics    Alcohol use: Yes    Drug use: Not Currently     Types: Oxycodone     Comment: quit 4 wks ago     Review of Systems "   Constitutional:  Negative for chills and fever.   Gastrointestinal:  Negative for abdominal distention, abdominal pain, nausea and vomiting.   Genitourinary:  Positive for vaginal bleeding. Negative for dysuria and pelvic pain.       Physical Exam     Initial Vitals [12/19/24 1910]   BP Pulse Resp Temp SpO2   110/63 85 17 98.8 °F (37.1 °C) 100 %      MAP       --         Physical Exam    Constitutional: She appears well-developed and well-nourished.   HENT:   Head: Normocephalic and atraumatic.   Abdominal: There is no abdominal tenderness.   No right CVA tenderness.  No left CVA tenderness.   Genitourinary:    Genitourinary Comments: Deferred  exam.  States that she is not currently bleeding.       Neurological: She is alert.         ED Course   Procedures  Labs Reviewed   VAGINAL SCREEN - Abnormal       Result Value    Trichomonas None      Clue Cells None      Budding Yeast None      Fungal Hyphae None      WBC - Vaginal Screen Rare (*)     Bacteria - Vaginal Screen Rare (*)     Wet Prep Source Vagina      Narrative:     Release to patient->Immediate   CBC W/ AUTO DIFFERENTIAL - Abnormal    WBC 10.26      RBC 4.62      Hemoglobin 13.0      Hematocrit 40.7      MCV 88      MCH 28.1      MCHC 31.9 (*)     RDW 15.9 (*)     Platelets 318      MPV 10.9      Immature Granulocytes 0.2      Gran # (ANC) 6.0      Immature Grans (Abs) 0.02      Lymph # 3.7      Mono # 0.5      Eos # 0.1      Baso # 0.03      nRBC 0      Gran % 58.4      Lymph % 35.7      Mono % 4.9      Eosinophil % 0.5      Basophil % 0.3      Differential Method Automated      Narrative:     Release to patient->Immediate   COMPREHENSIVE METABOLIC PANEL - Abnormal    Sodium 137      Potassium 3.5      Chloride 107      CO2 19 (*)     Glucose 85      BUN 10      Creatinine 0.7      Calcium 9.6      Total Protein 7.6      Albumin 4.1      Total Bilirubin 0.4      Alkaline Phosphatase 88      AST 12      ALT 14      eGFR >60      Anion Gap 11       "Narrative:     Release to patient->Immediate   URINALYSIS, REFLEX TO URINE CULTURE - Abnormal    Specimen UA Urine, Clean Catch      Color, UA Yellow      Appearance, UA Hazy (*)     pH, UA 7.0      Specific Gravity, UA 1.025      Protein, UA Trace (*)     Glucose, UA Negative      Ketones, UA Trace (*)     Bilirubin (UA) Negative      Occult Blood UA Trace (*)     Nitrite, UA Positive (*)     Urobilinogen, UA Negative      Leukocytes, UA 1+ (*)     Narrative:     Specimen Source->Urine   URINALYSIS MICROSCOPIC - Abnormal    RBC, UA 3      WBC, UA 19 (*)     Bacteria Moderate (*)     Squam Epithel, UA 27      Microscopic Comment SEE COMMENT      Narrative:     Specimen Source->Urine   POCT URINE PREGNANCY - Abnormal    POC Preg Test, Ur Positive (*)      Acceptable Yes     CULTURE, URINE   HCG, QUANTITATIVE    HCG Quant [id number]      Narrative:     Release to patient->Immediate   C. TRACHOMATIS/N. GONORRHOEAE BY AMP DNA   C. TRACHOMATIS/N. GONORRHOEAE BY AMP DNA   GROUP & RH    Group & Rh O POS            Imaging Results    None          Medications - No data to display  Medical Decision Making  Patient is an afebrile, well appearing 24 y.o. female who presents secondary to vaginal bleeding concerning for threatened . Denies abdominal pain. Denies any other complaints at this time.    Patient is approximately 4 weeks by LMP.   Patient's beta is Beta HCG Quant: [id number], bHCG yesterday at [facility] 14,094  Patient's Rh status is O pos. Rhogam is not indicated  US from [facility] yesterday  "probable intrauterine gestational sac with yolk sac, though no fetal pole visualized".  H/H stable    Patient not actively bleedingly hemorrhaging  Patient tolerating PO  No peritoneal signs on abdominal exam    VSS and do not suggest hemodynamic instability or sepsis. A&Ox4. The patient remained comfortable and stable during their visit in the ED. Details of ED course documented in ED workup.     Differential diagnosis for " includes, but is not limited to:   Ectopic: IUP confirmed in patient.  Threatened , missed , inevitable , complete , septic   Vaginal trauma: history and exam not consistent   UTI: UA is consistent with UTI (will treat with macrobid).     Differentials considered, but less likely: UTI, BV, trichomonas, vaginal candidiasis, gonorrhea, chlamydia, TOA, torsion, PCOS, abdominal uterine bleeding, neoplasm    All historical, clinical, radiographic, and laboratory findings reviewed. Findings are consistent with a diagnosis of threatened . There are no concerning features on physical exam to suggest an emergent or life threatening condition. No further intervention is indicated at this time and I am of the belief that that it is safe to discharge the patient from the emergency department.     Patient has been counseled regarding the need for follow-up as well as the indications to return to the emergency room should new or worrisome developments (including but not limited to worsening pain, worsening bleeding, lightheadedness, syncope) occur. Additionally, patient instructed to follow up with PCP and with OB/GYN in 2-3 days for recheck of today's complaints.    Discharge and follow-up instructions discussed with the patient who expressed understanding and willingness to comply with recommendations. Patient discharged from the emergency department in stable condition, in no acute distress.     Amount and/or Complexity of Data Reviewed  External Data Reviewed: labs and radiology.     Details: Reviewed notes and labs from [facility] yesterday  Labs: ordered. Decision-making details documented in ED Course.    Risk  Prescription drug management.               ED Course as of 24 2207   Thu Dec 19, 2024   2115 Pelvic ultrasound. Images were acquired transabdominally and transvaginally, in concert with color Doppler.     COMPARISON: Ultrasound OB 2024       FINDINGS:     The  uterus measures 0.8 x 4.5 x 4.9 cm (122 mL).     No myometrial masses are noted.     The cervix is within normal limits.     On the transvaginal images, within the endometrial cavity there is a round anechoic structure measuring 1.2 x 0.8 x 1.3 cm with an internal echogenic structure, suggestive of a gestational sac with possible yolk sac. No fetal pole is visualized. Based on gestational sac measurement, Average ultrasound age is estimated to be 5 weeks 6 days +/- 4 days.     The right ovary measures 5.0 x 2.8 x 3.2 cm (23 mL). Doppler flow is present. Within the right ovary there is a 2.0 x 1.8 x 2.0 cm round anechoic structure without peripheral flow on Doppler interrogation, likely a dominant follicle.     The left ovary left ovary measures 3.0 x 1.2 x 2.6 cm (5.0 mL). Doppler flow is present.     No adnexal masses are seen.     No free fluid is seen in the cul-de-sac.     Probable intrauterine gestational sac with yolk sac, though no fetal pole visualized. Short interval clinical follow-up with repeat ultrasound follow-is recommended.     Preliminary Report Dictated By: Farhat Sims MD     Electronically Signed By: Katharina Quinones MD 2024 2:14 PM CST    [KB]   2130 Beta HCG Quant: [id number]  bHCG yesterday at [facility] 14,094   [OB]      ED Course User Index  [KB] Sebastián Morelos MD  [OB] Mary Kate Gomez PA-C                           Clinical Impression:  Final diagnoses:  [O20.0] Threatened  (Primary)  [O23.41] Urinary tract infection in mother during first trimester of pregnancy          ED Disposition Condition    Discharge           ED Prescriptions       Medication Sig Dispense Start Date End Date Auth. Provider    nitrofurantoin, macrocrystal-monohydrate, (MACROBID) 100 MG capsule Take 1 capsule (100 mg total) by mouth 2 (two) times daily. for 5 days 10 capsule 2024 Mary Kate Gomez PA-C          Follow-up Information    None          Mary Kate Gomez PA-C  24 [id number]

## 2024-12-22 LAB
C TRACH DNA SPEC QL NAA+PROBE: NOT DETECTED
N GONORRHOEA DNA SPEC QL NAA+PROBE: NOT DETECTED

## 2025-01-07 ENCOUNTER — OFFICE VISIT (OUTPATIENT)
Dept: OBSTETRICS AND GYNECOLOGY | Facility: CLINIC | Age: 25
End: 2025-01-07
Payer: MEDICAID

## 2025-01-07 ENCOUNTER — LAB VISIT (OUTPATIENT)
Dept: LAB | Facility: HOSPITAL | Age: 25
End: 2025-01-07
Payer: MEDICAID

## 2025-01-07 VITALS
WEIGHT: 189.38 LBS | BODY MASS INDEX: 31.51 KG/M2 | DIASTOLIC BLOOD PRESSURE: 79 MMHG | SYSTOLIC BLOOD PRESSURE: 119 MMHG

## 2025-01-07 DIAGNOSIS — Z11.3 ROUTINE SCREENING FOR STI (SEXUALLY TRANSMITTED INFECTION): ICD-10-CM

## 2025-01-07 DIAGNOSIS — N89.8 VAGINAL DISCHARGE: ICD-10-CM

## 2025-01-07 DIAGNOSIS — O20.0 THREATENED ABORTION: ICD-10-CM

## 2025-01-07 DIAGNOSIS — O20.0 THREATENED ABORTION: Primary | ICD-10-CM

## 2025-01-07 LAB — HCG INTACT+B SERPL-ACNC: NORMAL MIU/ML

## 2025-01-07 PROCEDURE — 99213 OFFICE O/P EST LOW 20 MIN: CPT | Mod: PBBFAC,TH,PO | Performed by: OBSTETRICS & GYNECOLOGY

## 2025-01-07 PROCEDURE — 99999 PR PBB SHADOW E&M-EST. PATIENT-LVL III: CPT | Mod: PBBFAC,,, | Performed by: OBSTETRICS & GYNECOLOGY

## 2025-01-07 PROCEDURE — 84702 CHORIONIC GONADOTROPIN TEST: CPT

## 2025-01-07 PROCEDURE — 87591 N.GONORRHOEAE DNA AMP PROB: CPT

## 2025-01-07 PROCEDURE — 36415 COLL VENOUS BLD VENIPUNCTURE: CPT

## 2025-01-07 PROCEDURE — 81515 NFCT DS BV&VAGINITIS DNA ALG: CPT | Performed by: OBSTETRICS & GYNECOLOGY

## 2025-01-07 NOTE — PROGRESS NOTES
"CC: +upt; threatened      SUBJECTIVE:   24 y.o. female   for checkup. Patient's last menstrual period was 2024 (approximate).  EGA is 9 weeks, 0 days and EDC is 2025.  She was seen at [facility] ED on 2024 due to concern for threatened , vaginal bleeding.  Patient's beta HCG quant was 12,877.  Beta H[place] on 2024 at [facility] [place] 14,094.  US showed "probable intrauterine gestational sac with yolk sac, though no fetal pole visualized".  Pt denies passing any clots or tissue.  Denies further vaginal bleeding, cramping or pelvic pain.        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 [facility] and [facility] of psychiatric care     Panic disorder     Psychiatric exam requested by authority     Psychiatric problem     PTSD (post-traumatic stress disorder)     Substance abuse     Suicide attempt     Therapy     Withdrawal symptoms, drug or narcotic      History reviewed. No pertinent surgical history.  Social History     Socioeconomic History    Marital status: Single   Tobacco Use    Smoking status: Never    Smokeless tobacco: Never   Substance and Sexual Activity    Alcohol use: Not Currently    Drug use: Not Currently     Types: Oxycodone     Comment: quit 4 wks ago    Sexual activity: Not Currently   Other Topics Concern    Patient feels they ought to cut down on drinking/drug use No    Patient annoyed by others criticizing their drinking/drug use No    Patient has felt bad or guilty about drinking/drug use Yes    Patient has had a drink/used drugs as an eye opener in the AM No   Social History Narrative    ** Merged History Encounter **          Social Drivers of Health     Food Insecurity: Patient Unable To Answer (2024)    Received from [facility]    Hunger Vital Sign     Worried About " Running Out of Food in the Last Year: Patient unable to answer     Ran Out of Food in the Last Year: Patient unable to answer   Transportation Needs: Patient Unable To Answer (2024)    Received from [facility]    PRAPARE - Transportation     Lack of Transportation (Medical): Patient unable to answer     Lack of Transportation (Non-Medical): Patient unable to answer   Housing Stability: Patient Unable To Answer (2024)    Received from [facility]    Housing Stability Vital Sign     Unable to Pay for Housing in the Last Year: Patient unable to answer     Number of Places Lived in the Last Year: 0     Unstable Housing in the Last Year: Patient unable to answer     No family history on file.  OB History    Para Term  AB Living   3 1 1 0 1 1   SAB IAB Ectopic Multiple Live Births   1 0 0   1      # Outcome Date GA Lbr Harvey/2nd Weight Sex Type Anes PTL Lv   3 Current            2 2024           1 Term 24    F Vag-Spont   COY         Current Outpatient Medications   Medication Sig Dispense Refill    prenatal 21-iron fu-folic acid (PRENATAL COMPLETE) 14 mg iron- 400 mcg Tab Take 1 tablet by mouth once daily. 30 tablet 0    benzocaine-menthoL (CEPACOL SORE THROAT, PAXTON-MEN,) 15-3.6 mg Lozg 1 lozenge by Mucous Membrane route every 3 (three) hours as needed (Sore Throat). (Patient not taking: Reported on 2025) 16 lozenge 0    escitalopram oxalate (LEXAPRO) 10 MG tablet Take 1 tablet (10 mg total) by mouth once daily. (Patient not taking: Reported on 2025) 30 tablet 0    hydrOXYzine pamoate (VISTARIL) 25 MG Cap Take 25 mg by mouth 4 (four) times daily. (Patient not taking: Reported on 2025)      ibuprofen (ADVIL,MOTRIN) 800 MG tablet Take 1 tablet (800 mg total) by mouth every 6 (six) hours as needed for Pain. (Patient not taking: Reported on 2025) 20 tablet 0    LIDOcaine (LIDODERM) 5 % Place 1 patch onto the skin once daily. Remove & Discard patch within 12 hours or as  directed by MD (Patient not taking: Reported on 1/7/2025) 15 patch 0    meclizine (ANTIVERT) 25 mg tablet Take 1 tablet (25 mg total) by mouth 3 (three) times daily as needed. (Patient not taking: Reported on 1/7/2025) 20 tablet 0    prazosin (MINIPRESS) 2 MG Cap Take 1 capsule (2 mg total) by mouth every evening. (Patient not taking: Reported on 1/7/2025) 30 capsule 0     No current facility-administered medications for this visit.     Allergies: Patient has no known allergies.     ROS:  Constitutional: no weight loss, weight gain, fever, fatigue  Eyes:  No vision changes, glasses/contacts  ENT/Mouth: No ulcers, sinus problems, ears ringing, headache  Cardiovascular: No inability to lie flat, chest pain, exercise intolerance, swelling, heart palpitations  Respiratory: No wheezing, coughing blood, shortness of breath, or cough  Gastrointestinal: No diarrhea, bloody stool, nausea/vomiting, constipation, gas, hemorrhoids  Genitourinary: No blood in urine, painful urination, urgency of urination, frequency of urination, incomplete emptying, incontinence, abnormal bleeding, painful periods, heavy periods, vaginal discharge, vaginal odor, painful intercourse, sexual problems, bleeding after intercourse.  Musculoskeletal: No muscle weakness  Skin/Breast: No painful breasts, nipple discharge, masses, rash, ulcers  Neurological: No passing out, seizures, numbness, headache  Endocrine: No diabetes, hypothyroid, hyperthyroid, hot flashes, hair loss, abnormal hair growth, ance  Psychiatric: No depression, crying  Hematologic: No bruises, bleeding, swollen lymph nodes, anemia.      OBJECTIVE:   The patient appears well, alert, oriented x 3, in no distress.  /79   Wt 85.9 kg (189 lb 6 oz)   LMP 11/05/2024 (Approximate)   BMI 31.51 kg/m²   NECK: no thyromegaly, trachea midline  SKIN: no acne, striae, hirsutism  BREAST EXAM: not examined  ABDOMEN: no hernias, masses, or hepatosplenomegaly  GENITALIA: normal external  genitalia, no erythema  URETHRA: normal urethra, normal urethral meatus  VAGINA: Normal, yellow vaginal discharge present   CERVIX: appears closed, no lesions or cervical motion tenderness  UTERUS: normal  ADNEXA: normal adnexa      ASSESSMENT:   1. Threatened     2. Routine screening for STI (sexually transmitted infection)    3. Vaginal discharge        PLAN:   Pt counseled   Recheck beta HCG quantitative  Additional lab tests per orders  Return to clinic in 1 week    Orders Placed This Encounter    US OB <14 Wks TransAbd & TransVag, Single Gestation (XPD)    C. trachomatis/N. gonorrhoeae by AMP DNA [facility]; Cervix    HCG, Quantitative    Vaginosis Screen by DNA Probe         Echo Marti MD, MPH  [employer] PGY-3  2025

## 2025-01-09 LAB
C TRACH DNA SPEC QL NAA+PROBE: NOT DETECTED
N GONORRHOEA DNA SPEC QL NAA+PROBE: NOT DETECTED

## 2025-01-10 LAB
BACTERIAL VAGINOSIS DNA: DETECTED
CANDIDA GLABRATA/KRUSEI: NOT DETECTED
CANDIDA RRNA VAG QL PROBE: NOT DETECTED
TRICHOMONAS VAGINALIS: NOT DETECTED

## 2025-01-13 ENCOUNTER — TELEPHONE (OUTPATIENT)
Dept: OBSTETRICS AND GYNECOLOGY | Facility: CLINIC | Age: 25
End: 2025-01-13
Payer: MEDICAID

## 2025-01-13 ENCOUNTER — HOSPITAL ENCOUNTER (OUTPATIENT)
Dept: RADIOLOGY | Facility: HOSPITAL | Age: 25
Discharge: HOME | End: 2025-01-13
Attending: OBSTETRICS & GYNECOLOGY
Payer: MEDICAID

## 2025-01-13 DIAGNOSIS — Z32.01 POSITIVE URINE PREGNANCY TEST: Primary | ICD-10-CM

## 2025-01-13 DIAGNOSIS — O20.0 THREATENED ABORTION: ICD-10-CM

## 2025-01-13 PROCEDURE — 76801 OB US < 14 WKS SINGLE FETUS: CPT | Mod: 26,,, | Performed by: RADIOLOGY

## 2025-01-13 PROCEDURE — 76817 TRANSVAGINAL US OBSTETRIC: CPT | Mod: 26,,, | Performed by: RADIOLOGY

## 2025-01-13 PROCEDURE — 76817 TRANSVAGINAL US OBSTETRIC: CPT | Mod: TC

## 2025-01-13 RX ORDER — METRONIDAZOLE 500 MG/1
500 TABLET ORAL EVERY 12 HOURS
Qty: 14 TABLET | Refills: 0 | Status: SHIPPED | OUTPATIENT
Start: 2025-01-13

## 2025-01-13 NOTE — TELEPHONE ENCOUNTER
Notify pt of BV  Rx sent to pharmacy  Take as directed    Also have pt do HCG/progesterone today and will rpt in 2 days  Order placed

## 2025-01-14 ENCOUNTER — ROUTINE PRENATAL (OUTPATIENT)
Dept: OBSTETRICS AND GYNECOLOGY | Facility: CLINIC | Age: 25
End: 2025-01-14
Payer: MEDICAID

## 2025-01-14 ENCOUNTER — TELEPHONE (OUTPATIENT)
Dept: OBSTETRICS AND GYNECOLOGY | Facility: CLINIC | Age: 25
End: 2025-01-14
Payer: MEDICAID

## 2025-01-14 ENCOUNTER — LAB VISIT (OUTPATIENT)
Dept: LAB | Facility: HOSPITAL | Age: 25
End: 2025-01-14
Attending: OBSTETRICS & GYNECOLOGY
Payer: MEDICAID

## 2025-01-14 ENCOUNTER — TELEPHONE (OUTPATIENT)
Dept: OBSTETRICS AND GYNECOLOGY | Facility: CLINIC | Age: 25
End: 2025-01-14

## 2025-01-14 DIAGNOSIS — Z32.01 POSITIVE URINE PREGNANCY TEST: ICD-10-CM

## 2025-01-14 DIAGNOSIS — O03.4 INEVITABLE ABORTION: Primary | ICD-10-CM

## 2025-01-14 LAB
HCG INTACT+B SERPL-ACNC: 7498 MIU/ML
PROGEST SERPL-MCNC: 1.9 NG/ML

## 2025-01-14 PROCEDURE — 84144 ASSAY OF PROGESTERONE: CPT | Performed by: OBSTETRICS & GYNECOLOGY

## 2025-01-14 PROCEDURE — 99214 OFFICE O/P EST MOD 30 MIN: CPT | Mod: S$PBB,TH,, | Performed by: OBSTETRICS & GYNECOLOGY

## 2025-01-14 PROCEDURE — 99212 OFFICE O/P EST SF 10 MIN: CPT | Mod: PBBFAC,TH,PO | Performed by: OBSTETRICS & GYNECOLOGY

## 2025-01-14 PROCEDURE — 36415 COLL VENOUS BLD VENIPUNCTURE: CPT | Performed by: OBSTETRICS & GYNECOLOGY

## 2025-01-14 PROCEDURE — 84702 CHORIONIC GONADOTROPIN TEST: CPT | Performed by: OBSTETRICS & GYNECOLOGY

## 2025-01-14 PROCEDURE — 99999 PR PBB SHADOW E&M-EST. PATIENT-LVL II: CPT | Mod: PBBFAC,,, | Performed by: OBSTETRICS & GYNECOLOGY

## 2025-01-14 NOTE — PROGRESS NOTES
CC:  Chief Complaint   Patient presents with    Routine Prenatal Visit       HPI:    24 y.o.   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 [facility] and [facility] of psychiatric care     Panic disorder     Psychiatric exam requested by authority     Psychiatric problem     PTSD (post-traumatic stress disorder)     Substance abuse     Suicide attempt     Therapy     Withdrawal symptoms, drug or narcotic      History reviewed. No pertinent surgical history.  No family history on file.  Social History     Tobacco Use    Smoking status: Never    Smokeless tobacco: Never   Substance Use Topics    Alcohol use: Not Currently    Drug use: Not Currently     Types: Oxycodone     Comment: quit 4 wks ago     ROS:  GENERAL: Feeling well overall. Denies fever or chills.   SKIN: Denies rash or lesions.   HEAD: Denies head injury or headache.   NODES: Denies enlarged lymph nodes.   CHEST: Denies chest pain or shortness of breath.   CARDIOVASCULAR: Denies palpitations or left sided chest pain.    ABDOMEN: Denies diarrhea, nausea, vomiting or rectal bleeding.   URINARY: No dysuria, hematuria, or burning on urination.  REPRODUCTIVE: See HPI.   BREASTS: Denies pain, lumps, or nipple discharge.   HEMATOLOGIC: No easy bruisability or excessive  bleeding.   MUSCULOSKELETAL: Denies joint pain or swelling.   NEUROLOGIC: Denies syncope or weakness.   PSYCHIATRIC: Denies depression, anxiety or mood swings.      PE: LMP 2024 (Approximate)      APPEARANCE: Well nourished, well developed, in no acute distress.  SKIN: Normal skin turgor, no lesions.  NECK: Neck symmetric without masses or thyromegaly.  NODES: No inguinal, cervical, axillary or femoral lymph node enlargement.  CARDIOVASCULAR: Normal S1, S2. No rubs, murmurs or gallops.  NEUROLOGIC: Normal mood and affect. No depression or anxiety.   ABDOMEN: Soft. No tenderness or masses. No hepatosplenomegaly. No hernias.  RESPIRATORY: Normal respiratory effort with no retractions or use of accessory muscles.  BREASTS: Symmetrical, no skin changes or visible lesions. No palpable masses, nipple discharge, or adenopathy bilaterally.   BLADDER: Non-tender  GENITALIA: normal external genitalia, no erythema, no discharge  URETHRA: normal urethra, normal urethral meatus  VAGINA: blood present  CERVIX: bloody discharge and slightly open, no POC's visualized--need a large speculum   UTERUS: normal  ADNEXA: normal adnexa and no mass, fullness, tenderness  US OB <14 WEEKS, TRANSABDOM & TRANSVAG, SINGLE GESTATION (XPD)     CLINICAL HISTORY:  Threatened      TECHNIQUE:  Transabdominal sonography of the pelvis was performed, followed by transvaginal sonography to better evaluate the uterus and ovaries.     COMPARISON:  None.     Ultrasound 2023.     FINDINGS:  Intrauterine gestation(s): Single     Mean gestational sac diameter: 1.2 cm with an irregular shape.     Yolk sac: Not identified.     Crown-rump length (CRL): No identifiable embryo.     Cardiac activity: No identifiable embryo.     Subchorionic hemorrhage: None.     Right ovary: Corpus luteal cyst.     Left ovary: Probable dominant follicle.     Miscellaneous: No Free Fluid.     Impression:     1. Sonographic findings consistent with pregnancy of  "uncertain viability.  Recommend correlation with serial beta HCGs and repeat ultrasound in 7 days.        Electronically signed by:Levi Guerrero MD  Date:                                            2025  Time:                                           11:41              Component 3 wk ago   Group & Rh O POS                 Component Ref Range & Units 7 d ago  (25) 3 wk ago  (24) 1 yr ago  (23) 1 yr ago  (23) 4 yr ago  (10/12/20)   HCG Quant See Text mIU/mL [id number] CM        ASSESSMENT/ PLAN    Deidra MOLINA "DEIDRA" was seen today for routine prenatal visit.    Diagnoses and all orders for this visit:    Inevitable       Discussed with the patient management options for missed , including expectant management, medical management with Cytotec, and surgical management with D+C. The risks and benefits of each option were discussed and all of her questions were answered.  Will monitor vb and if excessive can return to ER  Will f/u on labs and follow hcg to 0  Will need contraception after due to pelvic frx needing time to heal      Farhat Rai MD  "

## 2025-01-14 NOTE — TELEPHONE ENCOUNTER
----- Message from Siobhan sent at 1/14/2025  9:21 AM CST -----  Type:  Patient Returning Call    Who Called:pt   Who Left Message for Patient:Ivania   Does the patient know what this is regarding?:yes  Would the patient rather a call back or a response via [facility]? Call   Best Call Back Number: [phone]  Additional Information:

## 2025-01-14 NOTE — TELEPHONE ENCOUNTER
Notified pt to bv and script for cure sent to pharmacy. Take as directed. Also told pt to get lab done for hcg and rpt in 2 days per provider. Pt verbalized understanding

## 2025-01-15 ENCOUNTER — TELEPHONE (OUTPATIENT)
Dept: OBSTETRICS AND GYNECOLOGY | Facility: CLINIC | Age: 25
End: 2025-01-15
Payer: MEDICAID

## 2025-01-15 NOTE — TELEPHONE ENCOUNTER
----- Message from Corina sent at 1/15/2025  9:53 AM CST -----  Type:  Patient Returning Call    Who Called: Pt   Who Left Message for Patient: Joythi   Does the patient know what this is regarding?: returning missed call   Would the patient rather a call back or a response via [facility]? Call   Best Call Back Number:[phone]   Additional Information:

## 2025-01-16 ENCOUNTER — HOSPITAL ENCOUNTER (EMERGENCY)
Facility: HOSPITAL | Age: 25
Discharge: HOME | End: 2025-01-16
Attending: STUDENT IN AN ORGANIZED HEALTH CARE EDUCATION/TRAINING PROGRAM
Payer: MEDICAID

## 2025-01-16 VITALS
DIASTOLIC BLOOD PRESSURE: 88 MMHG | HEART RATE: 109 BPM | TEMPERATURE: 99 F | HEIGHT: 65 IN | WEIGHT: 191 LBS | OXYGEN SATURATION: 99 % | RESPIRATION RATE: 20 BRPM | SYSTOLIC BLOOD PRESSURE: 125 MMHG | BODY MASS INDEX: 31.82 KG/M2

## 2025-01-16 DIAGNOSIS — J02.9 SORE THROAT: Primary | ICD-10-CM

## 2025-01-16 DIAGNOSIS — N93.9 VAGINA BLEEDING: ICD-10-CM

## 2025-01-16 LAB
ALBUMIN SERPL BCP-MCNC: 3.6 G/DL (ref 3.5–5.2)
ALP SERPL-CCNC: 81 U/L (ref 40–150)
ALT SERPL W/O P-5'-P-CCNC: 9 U/L (ref 10–44)
ANION GAP SERPL CALC-SCNC: 12 MMOL/L (ref 8–16)
AST SERPL-CCNC: 9 U/L (ref 10–40)
BASOPHILS # BLD AUTO: 0.02 K/UL (ref 0–0.2)
BASOPHILS NFR BLD: 0.1 % (ref 0–1.9)
BILIRUB SERPL-MCNC: 0.6 MG/DL (ref 0.1–1)
BILIRUB UR QL STRIP: NEGATIVE
BUN SERPL-MCNC: 5 MG/DL (ref 6–20)
CALCIUM SERPL-MCNC: 9.7 MG/DL (ref 8.7–10.5)
CHLORIDE SERPL-SCNC: 104 MMOL/L (ref 95–110)
CLARITY UR: CLEAR
CO2 SERPL-SCNC: 22 MMOL/L (ref 23–29)
COLOR UR: YELLOW
CREAT SERPL-MCNC: 0.7 MG/DL (ref 0.5–1.4)
CTP QC/QA: YES
CTP QC/QA: YES
DIFFERENTIAL METHOD BLD: ABNORMAL
EOSINOPHIL # BLD AUTO: 0 K/UL (ref 0–0.5)
EOSINOPHIL NFR BLD: 0.3 % (ref 0–8)
ERYTHROCYTE [DISTWIDTH] IN BLOOD BY AUTOMATED COUNT: 15.4 % (ref 11.5–14.5)
EST. GFR  (NO RACE VARIABLE): >60 ML/MIN/1.73 M^2
GLUCOSE SERPL-MCNC: 106 MG/DL (ref 70–110)
GLUCOSE UR QL STRIP: NEGATIVE
GROUP A STREP, MOLECULAR: NEGATIVE
HCG INTACT+B SERPL-ACNC: 7393 MIU/ML
HCT VFR BLD AUTO: 36 % (ref 37–48.5)
HETEROPH AB SERPL QL IA: NEGATIVE
HGB BLD-MCNC: 12 G/DL (ref 12–16)
HGB UR QL STRIP: ABNORMAL
IMM GRANULOCYTES # BLD AUTO: 0.06 K/UL (ref 0–0.04)
IMM GRANULOCYTES NFR BLD AUTO: 0.4 % (ref 0–0.5)
KETONES UR QL STRIP: NEGATIVE
LEUKOCYTE ESTERASE UR QL STRIP: NEGATIVE
LYMPHOCYTES # BLD AUTO: 1.9 K/UL (ref 1–4.8)
LYMPHOCYTES NFR BLD: 13 % (ref 18–48)
MCH RBC QN AUTO: 29.5 PG (ref 27–31)
MCHC RBC AUTO-ENTMCNC: 33.3 G/DL (ref 32–36)
MCV RBC AUTO: 89 FL (ref 82–98)
MICROSCOPIC COMMENT: NORMAL
MONOCYTES # BLD AUTO: 0.7 K/UL (ref 0.3–1)
MONOCYTES NFR BLD: 4.6 % (ref 4–15)
NEUTROPHILS # BLD AUTO: 11.7 K/UL (ref 1.8–7.7)
NEUTROPHILS NFR BLD: 81.6 % (ref 38–73)
NITRITE UR QL STRIP: NEGATIVE
NRBC BLD-RTO: 0 /100 WBC
PH UR STRIP: 8 [PH] (ref 5–8)
PLATELET # BLD AUTO: 256 K/UL (ref 150–450)
PMV BLD AUTO: 10.8 FL (ref 9.2–12.9)
POC MOLECULAR INFLUENZA A AGN: NEGATIVE
POC MOLECULAR INFLUENZA B AGN: NEGATIVE
POTASSIUM SERPL-SCNC: 3.9 MMOL/L (ref 3.5–5.1)
PROT SERPL-MCNC: 7.5 G/DL (ref 6–8.4)
PROT UR QL STRIP: NEGATIVE
RBC # BLD AUTO: 4.07 M/UL (ref 4–5.4)
RBC #/AREA URNS HPF: 1 /HPF (ref 0–4)
SARS-COV-2 RDRP RESP QL NAA+PROBE: NEGATIVE
SODIUM SERPL-SCNC: 138 MMOL/L (ref 136–145)
SP GR UR STRIP: 1.01 (ref 1–1.03)
SQUAMOUS #/AREA URNS HPF: 1 /HPF
URN SPEC COLLECT METH UR: ABNORMAL
UROBILINOGEN UR STRIP-ACNC: ABNORMAL EU/DL
WBC # BLD AUTO: 14.36 K/UL (ref 3.9–12.7)
WBC #/AREA URNS HPF: 0 /HPF (ref 0–5)
YEAST URNS QL MICRO: NORMAL

## 2025-01-16 PROCEDURE — 99283 EMERGENCY DEPT VISIT LOW MDM: CPT

## 2025-01-16 PROCEDURE — 80053 COMPREHEN METABOLIC PANEL: CPT | Performed by: PHYSICIAN ASSISTANT

## 2025-01-16 PROCEDURE — 85025 COMPLETE CBC W/AUTO DIFF WBC: CPT | Performed by: PHYSICIAN ASSISTANT

## 2025-01-16 PROCEDURE — 25000003 PHARM REV CODE 250: Performed by: PHYSICIAN ASSISTANT

## 2025-01-16 PROCEDURE — 87635 SARS-COV-2 COVID-19 AMP PRB: CPT | Performed by: STUDENT IN AN ORGANIZED HEALTH CARE EDUCATION/TRAINING PROGRAM

## 2025-01-16 PROCEDURE — 81000 URINALYSIS NONAUTO W/SCOPE: CPT | Performed by: PHYSICIAN ASSISTANT

## 2025-01-16 PROCEDURE — 86308 HETEROPHILE ANTIBODY SCREEN: CPT | Performed by: PHYSICIAN ASSISTANT

## 2025-01-16 PROCEDURE — 87502 INFLUENZA DNA AMP PROBE: CPT

## 2025-01-16 PROCEDURE — 84702 CHORIONIC GONADOTROPIN TEST: CPT | Performed by: PHYSICIAN ASSISTANT

## 2025-01-16 PROCEDURE — 87651 STREP A DNA AMP PROBE: CPT | Performed by: STUDENT IN AN ORGANIZED HEALTH CARE EDUCATION/TRAINING PROGRAM

## 2025-01-16 RX ORDER — AMOXICILLIN 875 MG/1
875 TABLET, FILM COATED ORAL 2 TIMES DAILY
Qty: 20 TABLET | Refills: 0 | Status: SHIPPED | OUTPATIENT
Start: 2025-01-16 | End: 2025-01-26

## 2025-01-16 RX ORDER — ACETAMINOPHEN 500 MG
1000 TABLET ORAL
Status: COMPLETED | OUTPATIENT
Start: 2025-01-16 | End: 2025-01-16

## 2025-01-16 RX ADMIN — ACETAMINOPHEN 1000 MG: 500 TABLET ORAL at 12:01

## 2025-01-16 NOTE — ED PROVIDER NOTES
Encounter Date: 1/16/2025       History     Chief Complaint   Patient presents with    Sore Throat     Sore throat x 2 days. Denies fever but states she has been having chills. States recent spontaneous AB, no abdominal pain. + nausea. No OTC meds taken.     24-year-old female presents to ED via EMS with concern of sore throat and body aches that began 2 days ago.  She does report her daughter recently tested positive for flu.  Denying nasal congestion, cough, shortness of breath.  She does state she has recently had a miscarriage in his followed by her Ob/gyn, Dr. Mathias.  She reports her vaginal bleeding began roughly 1 month ago and was initially intermittent but has become more constant and progressively heavier over past 2 weeks.  She did passed 1 large clot yesterday and then reports passing 5 clots this morning.  She does believe bleeding has since stopped.  No discharge or STD concerns.  Denying lightheadedness dizziness or generalized fatigue.  No urinary or bowel complaints.  No other acute complaints at this time    The history is provided by the patient.     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 [facility] and [facility] of psychiatric care     Panic disorder     Psychiatric exam requested by authority     Psychiatric problem     PTSD (post-traumatic stress disorder)     Substance abuse     Suicide attempt     Therapy     Withdrawal symptoms, drug or narcotic      History reviewed. No pertinent surgical history.  No family history on file.  Social History     Tobacco Use    Smoking status: Never    Smokeless tobacco: Never   Substance Use Topics    Alcohol use: Not Currently    Drug use: Not Currently     Types: Oxycodone     Comment: quit 4 wks ago      Review of Systems   Constitutional:  Positive for chills. Negative for fever.   HENT:  Positive for congestion and sore throat.    Respiratory:  Negative for cough and shortness of breath.    Cardiovascular:  Negative for chest pain.   Gastrointestinal:  Positive for abdominal pain, nausea and vomiting. Negative for constipation and diarrhea.   Genitourinary:  Negative for dysuria.   Musculoskeletal:  Negative for back pain, neck pain and neck stiffness.       Physical Exam     Initial Vitals [01/16/25 1052]   BP Pulse Resp Temp SpO2   125/88 109 20 99.3 °F (37.4 °C) 99 %      MAP       --         Physical Exam    Vitals reviewed.  Constitutional: Vital signs are normal. She appears well-developed and well-nourished. She is cooperative. She does not have a sickly appearance. She does not appear ill. No distress.   HENT:   Head: Normocephalic and atraumatic.   Bilateral oropharyngeal erythema.  With exudates.  Uvula remains midline.  No stridor or drooling.   Eyes: EOM are normal.   Neck:   Normal range of motion.  Cardiovascular:  Normal rate, regular rhythm and normal heart sounds.           Pulmonary/Chest: Effort normal and breath sounds normal.   Abdominal: There is abdominal tenderness in the suprapubic area.   Suprapubic tenderness noted on exam.  No guarding   Musculoskeletal:      Cervical back: Normal range of motion.     Neurological: She is alert and oriented to person, place, and time. GCS eye subscore is 4. GCS verbal subscore is 5. GCS motor subscore is 6.   Psychiatric: She has a normal mood and affect. Her speech is normal and behavior is normal.         ED Course   Procedures  Labs Reviewed   CBC W/ AUTO DIFFERENTIAL - Abnormal       Result Value    WBC 14.36 (*)     RBC 4.07      Hemoglobin 12.0      Hematocrit 36.0 (*)     MCV 89      MCH 29.5      MCHC 33.3      RDW 15.4 (*)     Platelets 256      MPV 10.8      Immature Granulocytes 0.4      Gran # (ANC) 11.7 (*)     Immature Grans (Abs) 0.06  (*)     Lymph # 1.9      Mono # 0.7      Eos # 0.0      Baso # 0.02      nRBC 0      Gran % 81.6 (*)     Lymph % 13.0 (*)     Mono % 4.6      Eosinophil % 0.3      Basophil % 0.1      Differential Method Automated      Narrative:     Release to patient->Immediate   COMPREHENSIVE METABOLIC PANEL - Abnormal    Sodium 138      Potassium 3.9      Chloride 104      CO2 22 (*)     Glucose 106      BUN 5 (*)     Creatinine 0.7      Calcium 9.7      Total Protein 7.5      Albumin 3.6      Total Bilirubin 0.6      Alkaline Phosphatase 81      AST 9 (*)     ALT 9 (*)     eGFR >60      Anion Gap 12      Narrative:     Release to patient->Immediate   URINALYSIS, REFLEX TO URINE CULTURE - Abnormal    Specimen UA Urine, Clean Catch      Color, UA Yellow      Appearance, UA Clear      pH, UA 8.0      Specific Gravity, UA 1.015      Protein, UA Negative      Glucose, UA Negative      Ketones, UA Negative      Bilirubin (UA) Negative      Occult Blood UA 1+ (*)     Nitrite, UA Negative      Urobilinogen, UA 2.0-3.0 (*)     Leukocytes, UA Negative      Narrative:     Specimen Source->Urine   GROUP A STREP, MOLECULAR    Group A Strep, Molecular Negative     HCG, QUANTITATIVE    HCG Quant 7393      Narrative:     Release to patient->Immediate   HETEROPHILE AB SCREEN   HETEROPHILE AB SCREEN    Monospot Negative      Narrative:     Release to patient->Immediate   URINALYSIS MICROSCOPIC    RBC, UA 1      WBC, UA 0      Yeast, UA None      Squam Epithel, UA 1      Microscopic Comment SEE COMMENT      Narrative:     Specimen Source->Urine   SARS-COV-2 RDRP GENE    POC Rapid COVID Negative       Acceptable Yes     POCT INFLUENZA A/B MOLECULAR    POC Molecular Influenza A Ag Negative      POC Molecular Influenza B Ag Negative       Acceptable Yes            Imaging Results    None          Medications   acetaminophen tablet 1,000 mg (1,000 mg Oral Given 1/16/25 1258)     Medical Decision Making  Patient presents  with concern of sore throat, chills and body aches that began 2 days ago.  Also reporting concern for continued vaginal bleeding from recent miscarriage.  Afebrile arrival with vitals grossly unremarkable.  Patient in no distress on exam    DDx:  Including but not limited to miscarriage, retained products, anemia, endometritis, UTI, COVID, influenza, strep, mono, URI, allergy/irritant, tonsillitis    Amount and/or Complexity of Data Reviewed  Labs: ordered. Decision-making details documented in ED Course.    Risk  OTC drugs.  Prescription drug management.               ED Course as of 01/16/25 1353   Thu Jan 16, 2025   1141 POCT Influenza A/B Molecular  Negative [KS]   1141 POCT COVID-19 Rapid Screening  Negative [KS]   1141 Group A Strep, Molecular  Negative [KS]   1141 CBC auto differential(!)  Slight elevation WBC 14.36 [KS]   1203 Comprehensive metabolic panel(!)  Grossly unremarkable [KS]   1205 Beta HCG Quant: 7393  Downtrending [KS]   1250 Patient was discussed with Ob/gyn on-call, Dr. Norris, who states no need for any emergent interventions at this time.  She also agrees with low concern for endometritis.  Patient can be offered Cytotec but is stable for discharge with close outpatient follow-up [KS]   1350 Heterophile Ab Screen  Negative [KS]   1350 Urinalysis, Reflex to Urine Culture Urine, Clean Catch(!)  No urinary infection [KS]   1350 Results discussed with patient.  Although strep test is negative, patient does have elevated Centor score.  Will plan to treat with amoxicillin.  I did encouraged Tylenol/Motrin as needed for symptom and fever control with hydration, rest, close monitoring of symptoms, PCP/Ob follow-up.  ED return precautions were discussed at length.  Patient states her understanding and agrees with plan [KS]      ED Course User Index  [KS] Sebastián De Guzman PA-C                             Clinical Impression:  Final diagnoses:  [J02.9] Sore throat (Primary)  [N93.9] Vagina bleeding           ED Disposition Condition    Discharge Stable          ED Prescriptions       Medication Sig Dispense Start Date End Date Auth. Provider    amoxicillin (AMOXIL) 875 MG tablet Take 1 tablet (875 mg total) by mouth 2 (two) times daily. for 10 days 20 tablet 1/16/2025 1/26/2025 Sebastián De Guzman PA-C          Follow-up Information       Follow up With Specialties Details Why Contact Info    Your Doctor                 Sebastián De Guzman PA-C  01/16/25 9886     Pounds Preamble Statement (Weight Entered In Details Tab): Reported Weight in pounds:

## 2025-01-16 NOTE — ED NOTES
Brought in by  EMS from home. Pt states she has confirmed pregnancy and began passing clots yesterday with lower abdominal pain. Pt showed picture of clots, appear to be 4 cm in size. Pt also complaining of sore throat and difficulty swallowing.

## 2025-01-16 NOTE — DISCHARGE INSTRUCTIONS
